# Patient Record
Sex: MALE | Race: WHITE | NOT HISPANIC OR LATINO | Employment: UNEMPLOYED | URBAN - METROPOLITAN AREA
[De-identification: names, ages, dates, MRNs, and addresses within clinical notes are randomized per-mention and may not be internally consistent; named-entity substitution may affect disease eponyms.]

---

## 2017-08-16 ENCOUNTER — APPOINTMENT (OUTPATIENT)
Dept: RADIOLOGY | Facility: MEDICAL CENTER | Age: 47
End: 2017-08-16
Attending: FAMILY MEDICINE
Payer: OTHER MISCELLANEOUS

## 2017-08-16 DIAGNOSIS — M25.561 RIGHT KNEE PAIN, UNSPECIFIED CHRONICITY: ICD-10-CM

## 2017-08-16 PROCEDURE — 73721 MRI JNT OF LWR EXTRE W/O DYE: CPT | Mod: RT

## 2019-06-12 ENCOUNTER — HOSPITAL ENCOUNTER (OUTPATIENT)
Dept: HOSPITAL 8 - RAD | Age: 49
Discharge: HOME | End: 2019-06-12
Attending: INTERNAL MEDICINE
Payer: COMMERCIAL

## 2019-06-12 DIAGNOSIS — I25.89: Primary | ICD-10-CM

## 2019-06-12 PROCEDURE — 78452 HT MUSCLE IMAGE SPECT MULT: CPT

## 2019-06-12 PROCEDURE — 93306 TTE W/DOPPLER COMPLETE: CPT

## 2019-06-12 PROCEDURE — A9502 TC99M TETROFOSMIN: HCPCS

## 2019-06-12 PROCEDURE — 93017 CV STRESS TEST TRACING ONLY: CPT

## 2019-07-10 ENCOUNTER — HOSPITAL ENCOUNTER (EMERGENCY)
Facility: MEDICAL CENTER | Age: 49
End: 2019-07-10
Attending: EMERGENCY MEDICINE
Payer: COMMERCIAL

## 2019-07-10 VITALS
HEIGHT: 66 IN | WEIGHT: 208.78 LBS | SYSTOLIC BLOOD PRESSURE: 126 MMHG | BODY MASS INDEX: 33.55 KG/M2 | DIASTOLIC BLOOD PRESSURE: 87 MMHG | RESPIRATION RATE: 16 BRPM | HEART RATE: 84 BPM | TEMPERATURE: 99.4 F | OXYGEN SATURATION: 96 %

## 2019-07-10 DIAGNOSIS — S09.91XA INJURY OF RIGHT EAR, INITIAL ENCOUNTER: ICD-10-CM

## 2019-07-10 PROCEDURE — 99284 EMERGENCY DEPT VISIT MOD MDM: CPT

## 2019-07-10 PROCEDURE — 700102 HCHG RX REV CODE 250 W/ 637 OVERRIDE(OP): Performed by: EMERGENCY MEDICINE

## 2019-07-10 PROCEDURE — A9270 NON-COVERED ITEM OR SERVICE: HCPCS | Performed by: EMERGENCY MEDICINE

## 2019-07-10 RX ORDER — NEOMYCIN SULFATE, POLYMYXIN B SULFATE AND HYDROCORTISONE 10; 3.5; 1 MG/ML; MG/ML; [USP'U]/ML
SUSPENSION/ DROPS AURICULAR (OTIC)
Status: COMPLETED
Start: 2019-07-10 | End: 2019-07-10

## 2019-07-10 RX ORDER — HYDROCODONE BITARTRATE AND ACETAMINOPHEN 5; 325 MG/1; MG/1
1-2 TABLET ORAL EVERY 6 HOURS PRN
Qty: 15 TAB | Refills: 0 | Status: SHIPPED | OUTPATIENT
Start: 2019-07-10 | End: 2019-07-14

## 2019-07-10 RX ADMIN — NEOMYCIN SULFATE, POLYMYXIN B SULFATE, HYDROCORTISONE 5 DROP: 3.5; 10000; 1 SOLUTION/ DROPS AURICULAR (OTIC) at 14:38

## 2019-07-10 NOTE — ED NOTES
Outpatient pharmacy called regarding Cortisporin ear drops. Clarified to be placed in right ear only.     Rosa Staples, IsabelD

## 2019-07-10 NOTE — LETTER
"  FORM C-4:  EMPLOYEE’S CLAIM FOR COMPENSATION/ REPORT OF INITIAL TREATMENT  EMPLOYEE’S CLAIM - PROVIDE ALL INFORMATION REQUESTED   First Name  Wil Last Name  Raciel Birthdate             Age  1970 49 y.o. Sex  male Claim Number   Home Employee Address  [address]                                     Zip  [address] Height  1.676 m (5' 6\") Weight  94.7 kg (208 lb 12.4 oz) [facility]     Mailing Employee Address                           [address]               Zip  [address] Telephone  [phone] (home)  Primary Language Spoken  ENGLISH   Insurer  [employer] Third [employer]   ALY CLAIMS [id number] Employee's Occupation (Job Title) When Injury or Occupational Disease Occurred     Employer's Name  [employer] Telephone  [phone]    Employer Address  [address]] Zip  [address]   Date of Injury  7/10/2019       Hour of Injury  1:10 PM Date Employer Notified  7/10/2019 Last Day of Work after Injury or Occupational Disease  7/10/2019 Supervisor to Whom Injury Reported  [place]   Address or Location of Accident (if applicable)  [[address] 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 Be specific and answer in detail. Use additional sheet if necessary)  Welding Slag fell in behind Helmet into ear   If you believe that you have an occupational disease, when did you first have knowledge of the disability and it relationship to your employment?  N/A Witnesses to the Accident  N/A     Nature of Injury or Occupational Disease  Foreign Body  Part(s) of Body Injured or Affected  Ear (R), N/A, N/A    I certify that the above is true and correct to the best of my knowledge and that I have provided this information in order to obtain the benefits of [place] " [place] or [place]).  I hereby authorize any physician, chiropractor, surgeon, practitioner, or other person, any hospital, including [facility] or [facility] hospital, any medical service organization, any insurance company, or other institution or organization to release to each other, any medical or other information, including benefits paid or payable, pertinent to this injury or disease, except information relative to diagnosis, treatment and/or counseling for AIDS, psychological conditions, alcohol or controlled substances, for which I must give specific authorization.  A Photostat of this authorization shall be as valid as the original.   Date  07/10/2019 Place:[facility]  Employee’s Signature   THIS REPORT MUST BE COMPLETED AND MAILED WITHIN 3 WORKING DAYS OF TREATMENT   Place  [facility], EMERGENCY DEPT  Name of Facility   [facility]   Date  7/10/2019 Diagnosis  (S09.91XA) Injury of right ear, initial encounter Is there evidence the injured employee was under the influence of alcohol and/or another controlled substance at the time of accident?   Hour  2:42 PM Description of Injury or Disease  Injury of right ear, initial encounter No   Treatment  Physician evaluation and treatment of thermal injury to the right external auditory canal and tympanic membrane  Have you advised the patient to remain off work five days or more?         No   X-Ray Findings  Negative   If Yes   From Date    To Date      From information given by the employee, together with medical evidence, can you directly connect this injury or occupational disease as job incurred?  Yes If No, is the employee capable of: Full Duty  Yes Modified Duty      Is additional medical care by a physician indicated?  Yes  Comments:Follow-up with Dr. Recio If Modified Duty, Specify any Limitations / Restrictions        Do you know  "of any previous injury or disease contributing to this condition or occupational disease?  No   Date  7/10/2019 Print Doctor’s Name  Leonardo Stephens I certify the employer’s copy of this form was mailed on:   Address  [address] [address] [phone] Insurer’s Use Only   [facility]  [address]    Provider’s Tax ID Number  [id number] Telephone  Dept: [phone]    Doctor’s Signature  e-LEONARDO Ro M.D. Degree       Original - TREATING PHYSICIAN OR CHIROPRACTOR   Pg 2-Insurer/TPA   Pg 3-Employer   Pg 4-Employee                                                                                                  Form C-4 (rev01/03)     BRIEF DESCRIPTION OF RIGHTS AND BENEFITS  (Pursuant to [place] 616C.050)    Notice of Injury or Occupational Disease (Incident Report Form C-1): If an injury or occupational disease (OD) arises out of and in the course of employment, you must provide written notice to your employer as soon as practicable, but no later than 7 days after the accident or OD. Your employer shall maintain a sufficient supply of the required forms.    Claim for Compensation (Form C-4): If medical treatment is sought, the form C-4 is available at the place of initial treatment. A completed \"Claim for Compensation\" (Form C-4) must be filed within 90 days after an accident or OD. The treating physician or chiropractor must, within 3 working days after treatment, complete and mail to the employer, the employer's insurer and third-party , the Claim for Compensation.    Medical Treatment: If you require medical treatment for your on-the-job injury or OD, you may be required to select a physician or chiropractor from a list provided by your workers’ compensation insurer, if it has contracted with an Organization for Managed Care (MCO) or Preferred Provider Organization (PPO) or providers of health care. If your employer has not entered into a contract with an MCO " or PPO, you may select a physician or chiropractor from the Panel of Physicians and Chiropractors. Any medical costs related to your industrial injury or OD will be paid by your insurer.    Temporary Total Disability (TTD): If your doctor has certified that you are unable to work for a period of at least 5 consecutive days, or 5 cumulative days in a 20-day period, or places restrictions on you that your employer does not accommodate, you may be entitled to TTD compensation.    Temporary Partial Disability (TPD): If the wage you receive upon reemployment is less than the compensation for TTD to which you are entitled, the insurer may be required to pay you TPD compensation to make up the difference. TPD can only be paid for a maximum of 24 months.    Permanent Partial Disability (PPD): When your medical condition is stable and there is an indication of a PPD as a result of your injury or OD, within 30 days, your insurer must arrange for an evaluation by a rating physician or chiropractor to determine the degree of your PPD. The amount of your PPD award depends on the date of injury, the results of the PPD evaluation and your age and wage.    Permanent Total Disability (PTD): If you are medically certified by a treating physician or chiropractor as permanently and totally disabled and have been granted a PTD status by your insurer, you are entitled to receive monthly benefits not to exceed 66 2/3% of your average monthly wage. The amount of your PTD payments is subject to reduction if you previously received a PPD award.    Vocational Rehabilitation Services: You may be eligible for vocational rehabilitation services if you are unable to return to the job due to a permanent physical impairment or permanent restrictions as a result of your injury or occupational disease.    Transportation and Per [place] Reimbursement: You may be eligible for travel expenses and per byron associated with medical treatment.  Reopening: You  may be able to reopen your claim if your condition worsens after claim closure.    Appeal Process: If you disagree with a written determination issued by the insurer or the insurer does not respond to your request, you may appeal to the Department of Administration, , by following the instructions contained in your determination letter. You must appeal the determination within 70 days from the date of the determination letter at [address], [address], [place], [place] [address], or [address], [place], [place] [address]. If you disagree with the  decision, you may appeal to the Department of Administration, . You must file your appeal within 30 days from the date of the  decision letter at [address], [address], [place], [place] [address], or [address], [place], [place] [address]. If you disagree with a decision of an , you may file a petition for judicial review with the District Court. You must do so within 30 days of the Appeal Officer’s decision. You may be represented by an  at your own expense or you may contact the [facility] for possible representation.    [place]  for Injured Workers (NAIW): If you disagree with a  decision, you may request that NAIW represent you without charge at an  Hearing. For information regarding denial of benefits, you may contact the [facility] at: [address], [address], [place] [address], [phone], or [address], [place] [address], [phone]    To File a Complaint with the Division: If you wish to file a complaint with the  of the Division of Industrial Relations (DIR), please contact the Workers’ Compensation Section, [address], [address], [place], [place] [address], telephone [phone], or [address], [address] [address] [facility] [address],  [place], [place] [address], telephone [phone].    For assistance with Workers’ Compensation Issues: you may contact the Office of the Governor Consumer Health Assistance, [address] [place] [address], [address], [place], [place] [address], Toll Free 1-820.298.7941, Web site: [email or web address]., E-mail [email or web address].                                                                                                                                                                               __________________________________________________________________                                    __07/10/2019_______________            Employee Name / Signature                                                                                                                            Date                                       D-2 (rev. 10/07)

## 2019-07-10 NOTE — ED PROVIDER NOTES
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 male who presents for evaluation of acute right ear injury.  The patient was working as a .  Apparently some molten metal got in his right external auditory canal.  This occurred around an hour ago.  He reports intense hearing loss no bleeding from the right ear.  Patient is up-to-date on his tetanus.  He is otherwise healthy no other injury to the face or eyes.    REVIEW OF SYSTEMS  See HPI for further details.  Positive for headache, right ear pain all other systems are negative.     PAST MEDICAL HISTORY  Past Medical History:   Diagnosis Date   • Right foot pain 7/2/2015   • BMI 36.0-36.9,adult 7/2/2015   • High cholesterol    • Hyperlipidemia        FAMILY HISTORY  Noncontributory    SOCIAL HISTORY  Social History     Social History   • Marital status: Single     Spouse name: N/A   • Number of children: N/A   • Years of education: N/A     Social History Main Topics   • Smoking status: Never Smoker   • Smokeless tobacco: Former User   • Alcohol use Yes      Comment: rare, twice/month   • Drug use: No   • Sexual activity: Yes     Partners: Female     Other Topics Concern   • Not on file     Social History Narrative   • No narrative on file     Denies IV drug  SURGICAL HISTORY  Past Surgical History:   Procedure Laterality Date   • CATARACT PHACO WITH IOL Left 1/12/2016    Procedure: CATARACT PHACO WITH IOL ANTERIOR CHAMBER;  Surgeon: Aureliano Epperson M.D.;  Location: SURGERY SURGICAL [facility];  Service:    • VITRECTOMY ANTERIOR Left 10/13/2015    Procedure: VITRECTOMY ANTERIOR - ANTERIOR SYNECHIALYSIS, IRIS REPAIR WITH MCCANNEL SUTURES;  Surgeon: Aureliano Epperson M.D.;  Location: SURGERY SURGICAL [facility];  Service:    • EYE SURGERY  10/3/08    Performed by SHANTEL GARAY at SURGERY SAME DAY [facility]  "ORS   • EYEBALL RUPTURE REPAIR  9/19/08    Performed by SHANTEL GARAY at SURGERY [facility] ORS   • OTHER ORTHOPEDIC SURGERY      bilateral rotator cuff repairs   • TONSILLECTOMY         CURRENT MEDICATIONS    Current Facility-Administered Medications:   •  neomycin sulf/polymyx B sulf/HC soln (CORTISPORIN HC SOL) 3.5-59652-2 otic solution 5 Drop, 5 Drop, Right Ear, Once, Leonardo Stephens M.D.    Current Outpatient Prescriptions:   •  atorvastatin (LIPITOR) 20 MG TABS, Take 1 Tab by mouth every day., Disp: 90 Tab, Rfl: 2  •  tramadol (ULTRAM) 50 MG TABS, Take  mg by mouth every four hours as needed., Disp: , Rfl:       ALLERGIES  No Known Allergies    PHYSICAL EXAM  VITAL SIGNS: /90   Pulse 79   Temp 37.3 °C (99.1 °F) (Temporal)   Resp 20   Ht 1.676 m (5' 6\")   Wt 94.7 kg (208 lb 12.4 oz)   BMI 33.70 kg/m²  Room air O2: 96    Constitutional: Well developed, Well nourished, No acute distress, Non-toxic appearance.   HENT: Normocephalic, Atraumatic, Bilateral external ears normal, Oropharynx moist, No oral exudates, Nose normal.  The right external auditory canal is erythematous the tympanic membrane appears to be intact but is red there is no clear or obvious perforation or foreign body  Eyes: PERRLA, EOMI, Conjunctiva normal, No discharge.   Neck: Normal range of motion, No tenderness, Supple, No stridor.   Cardiovascular: Normal heart rate, Normal rhythm, No murmurs, No rubs, No gallops.   Thorax & Lungs: Normal breath sounds, No respiratory distress, No wheezing, No chest tenderness.   Abdomen: Bowel sounds normal, Soft, No tenderness, No masses, No pulsatile masses.   Skin: Warm, Dry, No erythema, No rash.   Extremities: Intact distal pulses, No edema, No tenderness, No cyanosis, No clubbing.   Musculoskeletal: Good range of motion in all major joints. No tenderness to palpation or major deformities noted.   Neurologic: Alert & oriented x 3, Normal motor function, Normal sensory function, No " focal deficits noted.   Psychiatric: Anxious      COURSE & MEDICAL DECISION MAKING  Pertinent Labs & Imaging studies reviewed. (See chart for details)  Patient has femoral injury to the right external auditory canal and tympanic membrane.  I would expect his hearing should ultimately come back.  He will be referred to ENT for follow-up.  I will start him on a round again on Cortisporin otic    FINAL IMPRESSION  1.  Thermal injury to right tympanic membrane and external auditory canal         Electronically signed by: Leonardo Stephens, 7/10/2019 2:00 PM

## 2019-07-10 NOTE — ED TRIAGE NOTES
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

## 2020-08-07 ENCOUNTER — HOSPITAL ENCOUNTER (OUTPATIENT)
Dept: RADIOLOGY | Facility: MEDICAL CENTER | Age: 50
End: 2020-08-07
Attending: ORTHOPAEDIC SURGERY
Payer: COMMERCIAL

## 2020-08-07 DIAGNOSIS — M75.111 INCOMPLETE ROTATOR CUFF TEAR OR RUPTURE OF RIGHT SHOULDER, NOT SPECIFIED AS TRAUMATIC: ICD-10-CM

## 2020-08-07 PROCEDURE — 73221 MRI JOINT UPR EXTREM W/O DYE: CPT | Mod: RT

## 2020-11-19 ENCOUNTER — PRE-ADMISSION TESTING (OUTPATIENT)
Dept: ADMISSIONS | Facility: MEDICAL CENTER | Age: 50
End: 2020-11-19
Attending: ORTHOPAEDIC SURGERY
Payer: COMMERCIAL

## 2020-11-19 DIAGNOSIS — Z01.812 PRE-OPERATIVE LABORATORY EXAMINATION: ICD-10-CM

## 2020-11-19 LAB — COVID ORDER STATUS COVID19: NORMAL

## 2020-11-19 PROCEDURE — C9803 HOPD COVID-19 SPEC COLLECT: HCPCS

## 2020-11-19 PROCEDURE — U0003 INFECTIOUS AGENT DETECTION BY NUCLEIC ACID (DNA OR RNA); SEVERE ACUTE RESPIRATORY SYNDROME CORONAVIRUS 2 (SARS-COV-2) (CORONAVIRUS DISEASE [COVID-19]), AMPLIFIED PROBE TECHNIQUE, MAKING USE OF HIGH THROUGHPUT TECHNOLOGIES AS DESCRIBED BY CMS-2020-01-R: HCPCS

## 2020-11-19 RX ORDER — TESTOSTERONE CYPIONATE 200 MG/ML
INJECTION, SOLUTION INTRAMUSCULAR
COMMUNITY
Start: 2020-09-02 | End: 2024-03-07

## 2020-11-19 RX ORDER — HYDROXYZINE 50 MG/1
50 TABLET, FILM COATED ORAL PRN
COMMUNITY
Start: 2020-11-06 | End: 2022-09-06

## 2020-11-19 RX ORDER — SERTRALINE HYDROCHLORIDE 100 MG/1
TABLET, FILM COATED ORAL DAILY
COMMUNITY
End: 2022-09-06

## 2020-11-19 RX ORDER — ALPRAZOLAM 0.5 MG/1
TABLET ORAL PRN
COMMUNITY
Start: 2020-11-17 | End: 2022-09-06

## 2020-11-19 RX ORDER — ASPIRIN 81 MG/1
TABLET ORAL DAILY
COMMUNITY
End: 2022-09-06

## 2020-11-19 RX ORDER — CLOTRIMAZOLE 1 %
CREAM (GRAM) TOPICAL PRN
COMMUNITY
Start: 2020-08-25 | End: 2024-03-07

## 2020-11-19 RX ORDER — LEVOTHYROXINE SODIUM 88 UG/1
88 TABLET ORAL
COMMUNITY
Start: 2020-10-03 | End: 2022-09-06

## 2020-11-19 NOTE — OR NURSING
"Preadmit appointment: \" Preparing for your Procedure information\" sheet given to patient with verbal and written instructions. Patient instructed to continue prescribed medications through the day before surgery, instructed to take the following medications the day of surgery per anesthesia protocol: Euthrox, Sertraline, Alprazolam if needed  Verbal and written instructions on self isolation until surgery and covid symptoms to watch for given to patient and patient instructed to call MD if any symptoms develop prior to surgery/procedure. Pt states he will self isolate until surgery after covid testing done today. Pt denies issues with anesthesia  "

## 2020-11-20 LAB
SARS-COV-2 RNA RESP QL NAA+PROBE: NOTDETECTED
SPECIMEN SOURCE: NORMAL

## 2020-11-24 ENCOUNTER — ANESTHESIA EVENT (OUTPATIENT)
Dept: SURGERY | Facility: MEDICAL CENTER | Age: 50
End: 2020-11-24
Payer: COMMERCIAL

## 2020-11-24 ENCOUNTER — HOSPITAL ENCOUNTER (OUTPATIENT)
Facility: MEDICAL CENTER | Age: 50
End: 2020-11-24
Attending: ORTHOPAEDIC SURGERY | Admitting: ORTHOPAEDIC SURGERY
Payer: COMMERCIAL

## 2020-11-24 ENCOUNTER — ANESTHESIA (OUTPATIENT)
Dept: SURGERY | Facility: MEDICAL CENTER | Age: 50
End: 2020-11-24
Payer: COMMERCIAL

## 2020-11-24 VITALS
HEIGHT: 66 IN | DIASTOLIC BLOOD PRESSURE: 54 MMHG | SYSTOLIC BLOOD PRESSURE: 98 MMHG | OXYGEN SATURATION: 95 % | WEIGHT: 224.87 LBS | TEMPERATURE: 96.8 F | BODY MASS INDEX: 36.14 KG/M2 | RESPIRATION RATE: 16 BRPM | HEART RATE: 80 BPM

## 2020-11-24 DIAGNOSIS — Z01.812 PRE-OPERATIVE LABORATORY EXAMINATION: ICD-10-CM

## 2020-11-24 PROCEDURE — 700111 HCHG RX REV CODE 636 W/ 250 OVERRIDE (IP): Performed by: ANESTHESIOLOGY

## 2020-11-24 PROCEDURE — 160048 HCHG OR STATISTICAL LEVEL 1-5: Performed by: ORTHOPAEDIC SURGERY

## 2020-11-24 PROCEDURE — 160036 HCHG PACU - EA ADDL 30 MINS PHASE I: Performed by: ORTHOPAEDIC SURGERY

## 2020-11-24 PROCEDURE — 160029 HCHG SURGERY MINUTES - 1ST 30 MINS LEVEL 4: Performed by: ORTHOPAEDIC SURGERY

## 2020-11-24 PROCEDURE — 700101 HCHG RX REV CODE 250: Performed by: ANESTHESIOLOGY

## 2020-11-24 PROCEDURE — 501838 HCHG SUTURE GENERAL: Performed by: ORTHOPAEDIC SURGERY

## 2020-11-24 PROCEDURE — A9270 NON-COVERED ITEM OR SERVICE: HCPCS | Performed by: ANESTHESIOLOGY

## 2020-11-24 PROCEDURE — 700102 HCHG RX REV CODE 250 W/ 637 OVERRIDE(OP): Performed by: ANESTHESIOLOGY

## 2020-11-24 PROCEDURE — 160009 HCHG ANES TIME/MIN: Performed by: ORTHOPAEDIC SURGERY

## 2020-11-24 PROCEDURE — 64415 NJX AA&/STRD BRCH PLXS IMG: CPT | Performed by: ORTHOPAEDIC SURGERY

## 2020-11-24 PROCEDURE — 160041 HCHG SURGERY MINUTES - EA ADDL 1 MIN LEVEL 4: Performed by: ORTHOPAEDIC SURGERY

## 2020-11-24 PROCEDURE — C1713 ANCHOR/SCREW BN/BN,TIS/BN: HCPCS | Performed by: ORTHOPAEDIC SURGERY

## 2020-11-24 PROCEDURE — 700105 HCHG RX REV CODE 258: Performed by: ORTHOPAEDIC SURGERY

## 2020-11-24 PROCEDURE — 160025 RECOVERY II MINUTES (STATS): Performed by: ORTHOPAEDIC SURGERY

## 2020-11-24 PROCEDURE — 700111 HCHG RX REV CODE 636 W/ 250 OVERRIDE (IP): Performed by: ORTHOPAEDIC SURGERY

## 2020-11-24 PROCEDURE — 502581 HCHG PACK, SHOULDER ARTHROSCOPY: Performed by: ORTHOPAEDIC SURGERY

## 2020-11-24 PROCEDURE — 160002 HCHG RECOVERY MINUTES (STAT): Performed by: ORTHOPAEDIC SURGERY

## 2020-11-24 PROCEDURE — 160046 HCHG PACU - 1ST 60 MINS PHASE II: Performed by: ORTHOPAEDIC SURGERY

## 2020-11-24 PROCEDURE — 160035 HCHG PACU - 1ST 60 MINS PHASE I: Performed by: ORTHOPAEDIC SURGERY

## 2020-11-24 PROCEDURE — A6223 GAUZE >16<=48 NO W/SAL W/O B: HCPCS | Performed by: ORTHOPAEDIC SURGERY

## 2020-11-24 PROCEDURE — 700101 HCHG RX REV CODE 250: Performed by: ORTHOPAEDIC SURGERY

## 2020-11-24 DEVICE — ANCHOR KNOTLESS REELX STT 4.5MM (5EA/BX): Type: IMPLANTABLE DEVICE | Site: SHOULDER | Status: FUNCTIONAL

## 2020-11-24 RX ORDER — MIDAZOLAM HYDROCHLORIDE 1 MG/ML
1 INJECTION INTRAMUSCULAR; INTRAVENOUS
Status: DISCONTINUED | OUTPATIENT
Start: 2020-11-24 | End: 2020-11-24 | Stop reason: HOSPADM

## 2020-11-24 RX ORDER — ROCURONIUM BROMIDE 10 MG/ML
INJECTION, SOLUTION INTRAVENOUS PRN
Status: DISCONTINUED | OUTPATIENT
Start: 2020-11-24 | End: 2020-11-24 | Stop reason: SURG

## 2020-11-24 RX ORDER — OXYCODONE HCL 10 MG/1
10 TABLET, FILM COATED, EXTENDED RELEASE ORAL ONCE
Status: COMPLETED | OUTPATIENT
Start: 2020-11-24 | End: 2020-11-24

## 2020-11-24 RX ORDER — PHENYLEPHRINE HCL IN 0.9% NACL 0.5 MG/5ML
SYRINGE (ML) INTRAVENOUS PRN
Status: DISCONTINUED | OUTPATIENT
Start: 2020-11-24 | End: 2020-11-24 | Stop reason: SURG

## 2020-11-24 RX ORDER — MIDAZOLAM HYDROCHLORIDE 1 MG/ML
INJECTION INTRAMUSCULAR; INTRAVENOUS PRN
Status: DISCONTINUED | OUTPATIENT
Start: 2020-11-24 | End: 2020-11-24 | Stop reason: SURG

## 2020-11-24 RX ORDER — ONDANSETRON 2 MG/ML
INJECTION INTRAMUSCULAR; INTRAVENOUS PRN
Status: DISCONTINUED | OUTPATIENT
Start: 2020-11-24 | End: 2020-11-24 | Stop reason: SURG

## 2020-11-24 RX ORDER — OXYCODONE HCL 5 MG/5 ML
10 SOLUTION, ORAL ORAL
Status: DISCONTINUED | OUTPATIENT
Start: 2020-11-24 | End: 2020-11-24 | Stop reason: HOSPADM

## 2020-11-24 RX ORDER — LIDOCAINE HYDROCHLORIDE 20 MG/ML
INJECTION, SOLUTION EPIDURAL; INFILTRATION; INTRACAUDAL; PERINEURAL PRN
Status: DISCONTINUED | OUTPATIENT
Start: 2020-11-24 | End: 2020-11-24 | Stop reason: SURG

## 2020-11-24 RX ORDER — SODIUM CHLORIDE, SODIUM LACTATE, POTASSIUM CHLORIDE, CALCIUM CHLORIDE 600; 310; 30; 20 MG/100ML; MG/100ML; MG/100ML; MG/100ML
INJECTION, SOLUTION INTRAVENOUS CONTINUOUS
Status: DISCONTINUED | OUTPATIENT
Start: 2020-11-24 | End: 2020-11-24 | Stop reason: HOSPADM

## 2020-11-24 RX ORDER — EPINEPHRINE 1 MG/ML(1)
AMPUL (ML) INJECTION
Status: DISCONTINUED | OUTPATIENT
Start: 2020-11-24 | End: 2020-11-24 | Stop reason: HOSPADM

## 2020-11-24 RX ORDER — ROPIVACAINE HYDROCHLORIDE 5 MG/ML
INJECTION, SOLUTION EPIDURAL; INFILTRATION; PERINEURAL PRN
Status: DISCONTINUED | OUTPATIENT
Start: 2020-11-24 | End: 2020-11-24 | Stop reason: SURG

## 2020-11-24 RX ORDER — ACETAMINOPHEN 500 MG
1000 TABLET ORAL ONCE
Status: COMPLETED | OUTPATIENT
Start: 2020-11-24 | End: 2020-11-24

## 2020-11-24 RX ORDER — DEXAMETHASONE SODIUM PHOSPHATE 4 MG/ML
INJECTION, SOLUTION INTRA-ARTICULAR; INTRALESIONAL; INTRAMUSCULAR; INTRAVENOUS; SOFT TISSUE PRN
Status: DISCONTINUED | OUTPATIENT
Start: 2020-11-24 | End: 2020-11-24 | Stop reason: SURG

## 2020-11-24 RX ORDER — GABAPENTIN 300 MG/1
300 CAPSULE ORAL ONCE
Status: COMPLETED | OUTPATIENT
Start: 2020-11-24 | End: 2020-11-24

## 2020-11-24 RX ORDER — CEFAZOLIN SODIUM 1 G/3ML
INJECTION, POWDER, FOR SOLUTION INTRAMUSCULAR; INTRAVENOUS PRN
Status: DISCONTINUED | OUTPATIENT
Start: 2020-11-24 | End: 2020-11-24 | Stop reason: SURG

## 2020-11-24 RX ORDER — OXYCODONE HCL 5 MG/5 ML
5 SOLUTION, ORAL ORAL
Status: DISCONTINUED | OUTPATIENT
Start: 2020-11-24 | End: 2020-11-24 | Stop reason: HOSPADM

## 2020-11-24 RX ORDER — CELECOXIB 200 MG/1
400 CAPSULE ORAL ONCE
Status: COMPLETED | OUTPATIENT
Start: 2020-11-24 | End: 2020-11-24

## 2020-11-24 RX ORDER — KETOROLAC TROMETHAMINE 30 MG/ML
INJECTION, SOLUTION INTRAMUSCULAR; INTRAVENOUS PRN
Status: DISCONTINUED | OUTPATIENT
Start: 2020-11-24 | End: 2020-11-24 | Stop reason: SURG

## 2020-11-24 RX ORDER — HALOPERIDOL 5 MG/ML
1 INJECTION INTRAMUSCULAR
Status: DISCONTINUED | OUTPATIENT
Start: 2020-11-24 | End: 2020-11-24 | Stop reason: HOSPADM

## 2020-11-24 RX ADMIN — ROPIVACAINE HYDROCHLORIDE 15 ML: 5 INJECTION, SOLUTION EPIDURAL; INFILTRATION; PERINEURAL at 08:48

## 2020-11-24 RX ADMIN — CELECOXIB 400 MG: 200 CAPSULE ORAL at 08:20

## 2020-11-24 RX ADMIN — SUGAMMADEX 200 MG: 100 INJECTION, SOLUTION INTRAVENOUS at 10:09

## 2020-11-24 RX ADMIN — OXYCODONE HYDROCHLORIDE 10 MG: 10 TABLET, FILM COATED, EXTENDED RELEASE ORAL at 08:21

## 2020-11-24 RX ADMIN — Medication 100 MCG: at 09:21

## 2020-11-24 RX ADMIN — KETOROLAC TROMETHAMINE 30 MG: 30 INJECTION, SOLUTION INTRAMUSCULAR at 10:03

## 2020-11-24 RX ADMIN — Medication 100 MCG: at 09:23

## 2020-11-24 RX ADMIN — PROPOFOL 200 MG: 10 INJECTION, EMULSION INTRAVENOUS at 09:06

## 2020-11-24 RX ADMIN — CEFAZOLIN 2 G: 1 INJECTION, POWDER, FOR SOLUTION INTRAVENOUS at 09:08

## 2020-11-24 RX ADMIN — DEXAMETHASONE SODIUM PHOSPHATE 10 MG: 4 INJECTION, SOLUTION INTRAMUSCULAR; INTRAVENOUS at 09:09

## 2020-11-24 RX ADMIN — LIDOCAINE HYDROCHLORIDE 4 ML: 20 INJECTION, SOLUTION EPIDURAL; INFILTRATION; INTRACAUDAL; PERINEURAL at 08:48

## 2020-11-24 RX ADMIN — Medication 200 MCG: at 09:55

## 2020-11-24 RX ADMIN — Medication 200 MCG: at 09:35

## 2020-11-24 RX ADMIN — MIDAZOLAM HYDROCHLORIDE 2 MG: 1 INJECTION, SOLUTION INTRAMUSCULAR; INTRAVENOUS at 08:48

## 2020-11-24 RX ADMIN — ACETAMINOPHEN 1000 MG: 500 TABLET, FILM COATED ORAL at 08:20

## 2020-11-24 RX ADMIN — SODIUM CHLORIDE, POTASSIUM CHLORIDE, SODIUM LACTATE AND CALCIUM CHLORIDE: 600; 310; 30; 20 INJECTION, SOLUTION INTRAVENOUS at 07:12

## 2020-11-24 RX ADMIN — FENTANYL CITRATE 100 MCG: 50 INJECTION, SOLUTION INTRAMUSCULAR; INTRAVENOUS at 09:06

## 2020-11-24 RX ADMIN — GABAPENTIN 300 MG: 300 CAPSULE ORAL at 08:20

## 2020-11-24 RX ADMIN — ONDANSETRON 4 MG: 2 INJECTION INTRAMUSCULAR; INTRAVENOUS at 10:03

## 2020-11-24 RX ADMIN — ROCURONIUM BROMIDE 50 MG: 10 INJECTION, SOLUTION INTRAVENOUS at 09:06

## 2020-11-24 RX ADMIN — Medication 100 MCG: at 09:22

## 2020-11-24 RX ADMIN — WATER 15 ML: 100 IRRIGANT IRRIGATION at 07:12

## 2020-11-24 ASSESSMENT — PAIN SCALES - GENERAL: PAIN_LEVEL: 0

## 2020-11-24 ASSESSMENT — PAIN DESCRIPTION - PAIN TYPE: TYPE: SURGICAL PAIN

## 2020-11-24 NOTE — PROGRESS NOTES
Patient to pre-op. NPO status and allergies confirmed. IV access established. Consents signed and in chart. Patient demonstrates understanding of pain scale.Surgical site verified. Triple AIM completed. SCD's in place as appropriate. VSS. Patient resting comfortably.

## 2020-11-24 NOTE — ANESTHESIA PROCEDURE NOTES
Peripheral Block    Date/Time: 11/24/2020 8:48 AM  Performed by: Stefani Hernandez M.D.  Authorized by: Stefani Hernandez M.D.     Patient Location:  Pre-op  Start Time:  11/24/2020 8:48 AM  Reason for Block: at surgeon's request and post-op pain management    patient identified, IV checked, site marked, risks and benefits discussed, surgical consent, monitors and equipment checked, pre-op evaluation and timeout performed    Patient Position:  Sitting  Prep: ChloraPrep    Monitoring:  Heart rate, continuous pulse ox and cardiac monitor  Block Region:  Upper Extremity  Upper Extremity - Block Type:  BRACHIAL PLEXUS block, Interscalene approach    Laterality:  Right  Procedures: ultrasound guided  Image captured, interpreted and electronically stored.  Local Infiltration:  Lidocaine  Strength:  1 %  Dose:  3 ml  Block Type:  Single-shot  Needle Length:  50mm  Needle Gauge:  22 G  Needle Localization:  Ultrasound guidance  Injection Assessment:  Negative aspiration for heme, no paresthesia on injection, incremental injection and local visualized surrounding nerve on ultrasound  Evidence of intravascular injection: No

## 2020-11-24 NOTE — OR SURGEON
Immediate Post OP Note    PreOp Diagnosis: internal derangment rt shoulder    PostOp Diagnosis: rt shoulder RC tear, biceps tendonitis    Procedure(s):  ARTHROSCOPY, SHOULDER, WITH ROTATOR CUFF REPAIR WITH DEBRIDEMENT OF GLENOHUMERAL JOINT - Wound Class: Clean  ARTHROSCOPY, SHOULDER, WITH BICEPS  TENOTOMY - Wound Class: Clean  DECOMPRESSION, SUBACROMIAL SPACE - Wound Class: Clean    Surgeon(s):  Augusto Wilson M.D.    Anesthesiologist/Type of Anesthesia:  Anesthesiologist: Stefani Hernandez M.D./General    Surgical Staff:  Circulator: Anne Garcia R.N.; Kenan Jara R.N.  Scrub Person: Bernardino Hess    Specimens removed if any:  * No specimens in log *    Estimated Blood Loss: minimal    Findings: large rc tear    Complications: none        11/24/2020 10:13 AM Augusto Wilson M.D.

## 2020-11-24 NOTE — ANESTHESIA POSTPROCEDURE EVALUATION
Patient: Wil Schrader    Procedure Summary     Date: 11/24/20 Room / Location:  OR  / SURGERY [facility]    Anesthesia Start: 0902 Anesthesia Stop: 1016    Procedures:       ARTHROSCOPY, SHOULDER, WITH ROTATOR CUFF REPAIR WITH DEBRIDEMENT OF GLENOHUMERAL JOINT (Right Shoulder)      ARTHROSCOPY, SHOULDER, WITH BICEPS  TENOTOMY (Right Shoulder)      DECOMPRESSION, SUBACROMIAL SPACE (Right Shoulder) Diagnosis: (PARTIAL THICKNESS ROTATOR CUFF TEAR RIGHT)    Surgeons: Augusto Wilson M.D. Responsible Provider: Stefani Hernandez M.D.    Anesthesia Type: general, peripheral nerve block ASA Status: 2          Final Anesthesia Type: general, peripheral nerve block  Last vitals  BP   Blood Pressure: (!) 98/54, NIBP: 104/66    Temp   36.6 °C (97.9 °F)    Pulse   Pulse: 66   Resp   16    SpO2   94 %      Anesthesia Post Evaluation    Patient location during evaluation: PACU  Patient participation: complete - patient participated  Level of consciousness: awake and alert  Pain score: 0    Airway patency: patent  Anesthetic complications: no  Cardiovascular status: adequate  Respiratory status: acceptable  Hydration status: acceptable    PONV: none           Nurse Pain Score: 0 (NPRS)

## 2020-11-24 NOTE — ANESTHESIA PROCEDURE NOTES
Airway    Date/Time: 11/24/2020 9:07 AM  Performed by: Stefani Hernandez M.D.  Authorized by: Stefani Hernandez M.D.     Location:  OR  Urgency:  Elective  Indications for Airway Management:  Anesthesia      Spontaneous Ventilation: absent    Sedation Level:  Deep  Preoxygenated: Yes    Patient Position:  Sniffing  Mask Difficulty Assessment:  1 - vent by mask  Final Airway Type:  Endotracheal airway  Final Endotracheal Airway:  ETT  Cuffed: Yes    Technique Used for Successful ETT Placement:  Direct laryngoscopy  Devices/Methods Used in Placement:  Intubating stylet    Insertion Site:  Oral  Blade Type:  Nunu  Laryngoscope Blade/Videolaryngoscope Blade Size:  4  ETT Size (mm):  7.0  Measured from:  Teeth  ETT to Teeth (cm):  23  Placement Verified by: auscultation and capnometry    Cormack-Lehane Classification:  Grade IIb - view of arytenoids or posterior of glottis only  Number of Attempts at Approach:  1

## 2020-11-24 NOTE — OR NURSING
1023: Care assumed of pt who rouses spontaneously and denies pain/nausea. O2 weaning commenced, respirations spontaneous and non-labored. Icepack applied over c/d/i R shoulder surgical dressings. Plan to keep pt in PACU for full hour per STOPBANG protocol.  1030: sleeping, not roused  1040: rouses spontaneously. DB&C, O2 d/mark, commenced po water.  1045: No change in surgical site assessment.  1100: Verbalizes comfort. Pupils noted to be unequal - pt states he has has multiple eye surgeries and this is his baseline.  1115: No change in surgical site assessment.Meets criteria to transfer to Stage 2

## 2020-11-24 NOTE — OR NURSING
1126: To stage ll bathroom, then to chair w/ standby assist. No pain or nausea.  1149: Home care instructions reviewed w/ pt and GF. No questions, meets criteria for discharge.

## 2020-11-24 NOTE — ANESTHESIA PREPROCEDURE EVALUATION
Torn rotator cuff right  Obesity  Elevated lipids    Relevant Problems   No relevant active problems       Physical Exam    Airway   Mallampati: II  TM distance: >3 FB  Neck ROM: full       Cardiovascular - normal exam  Rhythm: regular  Rate: normal     Dental - normal exam           Pulmonary - normal exam  Breath sounds clear to auscultation     Abdominal    Neurological - normal exam               Anesthesia Plan    ASA 2       Plan - general and peripheral nerve block     Peripheral nerve block will be post-op pain control  Airway plan will be ETT        Induction: intravenous      Pertinent diagnostic labs and testing reviewed    Informed Consent:    Anesthetic plan and risks discussed with patient.

## 2020-11-24 NOTE — ANESTHESIA TIME REPORT
Anesthesia Start and Stop Event Times     Date Time Event    11/24/2020 0858 Ready for Procedure     0902 Anesthesia Start     1016 Anesthesia Stop        Responsible Staff  11/24/20    Name Role Begin End    Stefani Hernandez M.D. Anesth 0902 1016        Preop Diagnosis (Free Text):  Pre-op Diagnosis     PARTIAL THICKNESS ROTATOR CUFF TEAR RIGHT        Preop Diagnosis (Codes):    Post op Diagnosis  Right rotator cuff tear      Premium Reason  Non-Premium    Comments:

## 2020-11-24 NOTE — PROGRESS NOTES
1015 Patient to PACU from OR on hospital bed with side rails up for safety, accompanied by RN and anesthesia. Identity and allergies verified. Bed locked and in low position. OPA in place 6L 02 mask on, respirations spontaneous and unlabored. VSS. SCD's on and functioning appropriately. 2+ pulses BLE. Surgical dressing to right upper extremity clean dry and intact.

## 2020-11-24 NOTE — OP REPORT
DATE OF SERVICE:  11/24/2020    PREOPERATIVE DIAGNOSIS:  Internal derangement, right shoulder.    POSTOPERATIVE DIAGNOSES:  1.  Right shoulder rotator cuff tear.  2.  Right shoulder subacromial impingement.  3.  Right shoulder biceps tendonitis.    PROCEDURES PERFORMED:  1.  Arthroscopy, shoulder, surgical; with rotator cuff repair.  2.  Arthroscopy, shoulder, surgical; with subacromial decompression.  3.  Arthroscopy, shoulder, surgical; with debridement of glenohumeral joint   including biceps tenotomy.    SURGEON:  Augusto Wilson MD    ASSISTANT:  SARA Barone    ANESTHESIA:  General endotracheal anesthesia with interscalene block.    ANESTHESIOLOGIST:  Stefani Hernandez MD    ESTIMATED BLOOD LOSS:  Minimal.    COMPLICATIONS:  None.    INDICATIONS FOR PROCEDURE:  This 50-year-old male with significant weakness   and pain in his right shoulder.  MRI demonstrates a large rotator cuff tear.    He presents for operative treatment.  For further details of H and P, please   see chart.    Risks, benefits, and alternatives of the procedure were discussed with the   patient to include but not limited to bleeding, infection, neurovascular   injury, need for further surgery, PE, DVT, blood transfusion with its   associated risks, anesthesia with its associated risks, and death.  All   questions were answered.  No warranties or guarantees were given or implied.    Consent is signed and on chart.    DESCRIPTION OF PROCEDURE:  Patient was taken to the operating room, placed   supine on the operating table.  Prior to this, an interscalene block was   placed in preoperative holding for postoperative pain control.  The patient   was placed in beach chair position.  All bony prominences were well padded.    The right shoulder and upper extremity were prepped and draped in normal   sterile fashion.  Posterior portal was identified and spinal needle was placed   in the glenohumeral joint.  This was insufflated with 60  mL of normal saline.    Portal was incised and arthroscope placed in the shoulder.  Anterior portal   was created using outside-in technique.  Examination shows severe fraying of   the biceps tendon with some scarring of the biceps tendon and the anterior   capsule.  A shaver was placed and biceps tenotomy was performed.  The biceps   stump was debrided back.  Examination of subscapular showed some mild   splitting of the fibers, but no tearing off of its insertion.  There was mild   chondromalacia of the glenoid.  No chondromalacia of the humerus.  Rotator   cuff showed a large retracted tear.  Shaver was placed and the rotator cuff   was debrided back.  Labrum and the remaining biceps had previously been   debrided.  At this point, the arthroscope was removed from the glenohumeral   joint and placed in subacromial space.  A lateral portal was created.    Bursectomy was performed.  Soft tissue was removed off the underside of the   acromion.  There was a small subacromial spur.  This was debrided back using a   bone resector.  Footprint for the rotator cuff was then cleaned up to a good   bleeding tissue using a bone resector.  Examination of the cuff showed to be   freely mobile.  There was some significant thickening and fraying.  The edge   was shaved back using a shaver.  Three mattress type stitches were then placed   using a Cobra suture passer.  Three [place] ReelX anchors were used to   reapproximate the cuff.  This reapproximated the cuff to the footprint quite   nicely.  There was no liftoff or other abnormalities noted.  At this point,   the arthroscope was removed from the shoulder.  As much fluid was expressed as   possible.  The portals were closed using 4-0 nylon.  The patient was placed   in a soft sterile dressing and abduction sling.  He was awakened from   anesthesia and returned to recovery cart in the recovery room in stable   condition.  There were no omari or intraoperative complications  noted.       ____________________________________     MD ROSIBEL Oliveira / [place]    DD:  11/24/2020 10:17:39  DT:  11/24/2020 10:36:58    D#:  [id number]  Job#:  [id number]

## 2020-11-24 NOTE — DISCHARGE INSTRUCTIONS
ACTIVITY: Rest and take it easy for the first 24 hours.  A responsible adult is recommended to remain with you during that time.  It is normal to feel sleepy.  We encourage you to not do anything that requires balance, judgment or coordination.    MILD FLU-LIKE SYMPTOMS ARE NORMAL. YOU MAY EXPERIENCE GENERALIZED MUSCLE ACHES, THROAT IRRITATION, HEADACHE AND/OR SOME NAUSEA.    FOR 24 HOURS DO NOT:  Drive, operate machinery or run household appliances.  Drink beer or alcoholic beverages.   Make important decisions or sign legal documents.    SPECIAL INSTRUCTIONS: Wear immobilizer at all times until instructed otherwise by your surgeon. DO NOT bear any weight with operative hand/arm.    DIET: To avoid nausea, slowly advance diet as tolerated, avoiding spicy or greasy foods for the first day.  Add more substantial food to your diet according to your physician's instructions.  Babies can be fed formula or breast milk as soon as they are hungry.  INCREASE FLUIDS AND FIBER TO AVOID CONSTIPATION.    SURGICAL DRESSING/BATHING: Keep dressing clean, dry and intact until instructed otherwise by surgeon    FOLLOW-UP APPOINTMENT:  A follow-up appointment should be arranged with your doctor; call to schedule.    You should CALL YOUR PHYSICIAN if you develop:  Fever greater than 101 degrees F.  Pain not relieved by medication, or persistent nausea or vomiting.  Excessive bleeding (blood soaking through dressing) or unexpected drainage from the wound.  Extreme redness or swelling around the incision site, drainage of pus or foul smelling drainage.  Inability to urinate or empty your bladder within 8 hours.  Problems with breathing or chest pain.    You should call 911 if you develop problems with breathing or chest pain.  If you are unable to contact your doctor or surgical center, you should go to the nearest emergency room or urgent care center.  Physician's telephone #: [phone]    If any questions arise, call your doctor.  If  your doctor is not available, please feel free to call the Surgical Center at [phone]. The Contact Center is open Monday through Friday 7AM to 5PM and may speak to a nurse at [phone], or toll free at [phone].     A registered nurse may call you a few days after your surgery to see how you are doing after your procedure.    MEDICATIONS: Resume taking daily medication.  Take prescribed pain medication with food.  If no medication is prescribed, you may take non-aspirin pain medication if needed.  PAIN MEDICATION CAN BE VERY CONSTIPATING.  Take a stool softener or laxative such as senokot, pericolace, or milk of magnesia if needed.    Prescription given for pre-op.  Last pain medication given at _________________.    If your physician has prescribed pain medication that includes Acetaminophen (Tylenol), do not take additional Acetaminophen (Tylenol) while taking the prescribed medication.    Depression / Suicide Risk    As you are discharged from this [facility] facility, it is important to learn how to keep safe from harming yourself.    Recognize the warning signs:  · Abrupt changes in personality, positive or negative- including increase in energy   · Giving away possessions  · Change in eating patterns- significant weight changes-  positive or negative  · Change in sleeping patterns- unable to sleep or sleeping all the time   · Unwillingness or inability to communicate  · Depression  · Unusual sadness, discouragement and loneliness  · Talk of wanting to die  · Neglect of personal appearance   · Rebelliousness- reckless behavior  · Withdrawal from people/activities they love  · Confusion- inability to concentrate     If you or a loved one observes any of these behaviors or has concerns about self-harm, here's what you can do:  · Talk about it- your feelings and reasons for harming yourself  · Remove any means that you might use to hurt yourself (examples: pills, rope, extension cords,  firearm)  · Get professional help from the community (Mental Health, Substance Abuse, psychological counseling)  · Do not be alone:Call your Safe Contact- someone whom you trust who will be there for you.  · Call your local CRISIS HOTLINE 533-8377 or 753-235-0680  · Call your local Children's Mobile Crisis Response Team [place] (552) 104-3996 or www.[employer]  · Call the toll free National Suicide Prevention Hotlines   · National Suicide Prevention Lifeline 834-392-HNOA (5442)  [facility] 800-SUICIDE (583-0891)    Discharge Education for patients on DENNIS (Obstructive Sleep Apnea) Protocol    Prior to receiving sedation or anesthesia, we screen all patients for Obstructive Sleep Apnea.  During your screening, you were identified as having suspected, but not confirmed Obstructive Sleep Apnea(DENNIS).    What is Obstructive Sleep Apnea?  Sleep apnea (AP-ne-ah) is a common disorder which involves breathing pauses that occur during sleep.  These can last from 10 seconds to a minute or longer.  Normal breathing resumes often with a loud snort or choking sound.    Sleep apnea occurs in all age groups and both genders but is more common in men and people over 40 years of age.  It has been estimated that as many as 18 million Americans have sleep apnea.  Most people who have sleep apnea don’t know they have it because it only occurs during sleep.  A family member and/or bed partner may first notice the signs of sleep apnea.  Sleep apnea is a chronic (ongoing) condition that disrupts the quality and quantity of your sleep repeatedly throughout the night.  This often results in excessive daytime sleepiness or fatigue during the day.  It may also contribute to high blood pressure, heart problems, and complications following medications used for surgery and procedures.    To establish a definitive diagnosis, further testing from a specialist would be needed.  We recommend that you follow up with your  primary care physician.    We recommend that you should be with an adult observer for at least 24 hours after your sedation/anesthesia.  If you have a CPAP machine, you should wear it during any sleep period (day or night) for the week following your procedure.  We encourage you to sleep on your side or in a sitting position, even with napping.  Lying flat on your back increases the risk of apnea and airway obstruction during your post procedure recovery period.    It is important to prevent over-sedation that could increase your risk for apnea.  Please take all pain medication as directed by your physician.  If you are not getting pain relief, please contact your physician to discuss possible approaches to relieving pain while minimizing medications that can affect your breathing and oxygen levels.      Peripheral Nerve Block Discharge Instructions from Same Day Surgery and Inpatient :    What to Expect - Upper Extremity  · You may experience numbness and weakness in shoulder, arm and hand  on the same side as your surgery  · This is normal. For some people, this may be an unpleasant sensation. Be very careful with your numb limb  · Ask for help when you need it  Shoulder Surgery Side Effects  · In addition to numbness and weakness you may experience other symptoms  · Other nerves that are close to those nerves injected can also be affected by local anesthesia  · You may experience a hoarseness in your voice  · Your breathing may feel different  · You may also notice drooping of your eyelid, pupil constriction, and decreased sweating, on the side of your surgery  · All of these side effects are normal and will resolve when the local anesthetic wears off   Prevent Injury  · Protect the limb like a baby  · Beware of exposing your limb to extreme heat or cold or trauma  · The limb may be injured without you noticing because it is numb  · Keep the limb elevated whenever possible  · Do not sleep on the limb  · Change  "the position of the limb regularly  · Avoid putting pressure on your surgical limb  Pain Control  · The initial block on the day of surgery will make your extremity feel \"numb\"  · Any consecutive injection including prior to discharge from the hospital will make your extremity feel \"numb\"  · You may feel an aching or burning when the local anesthesia starts to wear off  · Take pain pills as prescribed by your surgeon  · Call your surgeon or anesthesiologist if you do not have adequate pain control    "

## 2021-05-03 ENCOUNTER — HOSPITAL ENCOUNTER (OUTPATIENT)
Dept: RADIOLOGY | Facility: MEDICAL CENTER | Age: 51
End: 2021-05-03
Attending: FAMILY MEDICINE
Payer: COMMERCIAL

## 2021-05-05 ENCOUNTER — HOSPITAL ENCOUNTER (OUTPATIENT)
Dept: RADIOLOGY | Facility: MEDICAL CENTER | Age: 51
End: 2021-05-05
Attending: FAMILY MEDICINE
Payer: COMMERCIAL

## 2021-05-05 DIAGNOSIS — N20.0 URIC ACID NEPHROLITHIASIS: ICD-10-CM

## 2021-05-05 DIAGNOSIS — R10.10 UPPER ABDOMINAL PAIN: ICD-10-CM

## 2021-05-05 PROCEDURE — 74176 CT ABD & PELVIS W/O CONTRAST: CPT

## 2022-07-26 ENCOUNTER — HOSPITAL ENCOUNTER (OUTPATIENT)
Facility: MEDICAL CENTER | Age: 52
End: 2022-07-26
Attending: ORTHOPAEDIC SURGERY | Admitting: ORTHOPAEDIC SURGERY
Payer: COMMERCIAL

## 2022-09-06 ENCOUNTER — PRE-ADMISSION TESTING (OUTPATIENT)
Dept: ADMISSIONS | Facility: MEDICAL CENTER | Age: 52
End: 2022-09-06
Attending: ORTHOPAEDIC SURGERY
Payer: COMMERCIAL

## 2022-09-06 ENCOUNTER — ANESTHESIA EVENT (OUTPATIENT)
Dept: SURGERY | Facility: MEDICAL CENTER | Age: 52
End: 2022-09-06
Payer: COMMERCIAL

## 2022-09-06 NOTE — PREPROCEDURE INSTRUCTIONS
"Preadmit Phone appointment: \" Preparing for your Procedure information\" Instructions discussed with Patient.       Patient instructed to continue prescribed medications through the day before surgery, instructed to take the following medications the day of surgery per anesthesia protocol: none.        Pt states, \"no issues with anesthesia\".  Fasting guidelines discussed with Patient, patient will follow MD's instructions for Pre-Surgery Diet.      All Pt's questions and concerns answered or addressed.  Emailed all instructions.    "

## 2022-09-07 ENCOUNTER — HOSPITAL ENCOUNTER (OUTPATIENT)
Facility: MEDICAL CENTER | Age: 52
End: 2022-09-07
Attending: ORTHOPAEDIC SURGERY | Admitting: ORTHOPAEDIC SURGERY
Payer: COMMERCIAL

## 2022-09-07 ENCOUNTER — ANESTHESIA (OUTPATIENT)
Dept: SURGERY | Facility: MEDICAL CENTER | Age: 52
End: 2022-09-07
Payer: COMMERCIAL

## 2022-09-07 VITALS
SYSTOLIC BLOOD PRESSURE: 123 MMHG | RESPIRATION RATE: 19 BRPM | BODY MASS INDEX: 34.26 KG/M2 | OXYGEN SATURATION: 95 % | HEIGHT: 66 IN | TEMPERATURE: 97 F | WEIGHT: 213.19 LBS | HEART RATE: 72 BPM | DIASTOLIC BLOOD PRESSURE: 73 MMHG

## 2022-09-07 PROCEDURE — 76942 ECHO GUIDE FOR BIOPSY: CPT | Mod: 26 | Performed by: ANESTHESIOLOGY

## 2022-09-07 PROCEDURE — 160002 HCHG RECOVERY MINUTES (STAT): Performed by: ORTHOPAEDIC SURGERY

## 2022-09-07 PROCEDURE — 700111 HCHG RX REV CODE 636 W/ 250 OVERRIDE (IP): Performed by: ANESTHESIOLOGY

## 2022-09-07 PROCEDURE — 64415 NJX AA&/STRD BRCH PLXS IMG: CPT | Performed by: ORTHOPAEDIC SURGERY

## 2022-09-07 PROCEDURE — 700105 HCHG RX REV CODE 258: Performed by: ORTHOPAEDIC SURGERY

## 2022-09-07 PROCEDURE — 160025 RECOVERY II MINUTES (STATS): Performed by: ORTHOPAEDIC SURGERY

## 2022-09-07 PROCEDURE — C1713 ANCHOR/SCREW BN/BN,TIS/BN: HCPCS | Performed by: ORTHOPAEDIC SURGERY

## 2022-09-07 PROCEDURE — 700102 HCHG RX REV CODE 250 W/ 637 OVERRIDE(OP): Performed by: ANESTHESIOLOGY

## 2022-09-07 PROCEDURE — 700101 HCHG RX REV CODE 250: Performed by: ANESTHESIOLOGY

## 2022-09-07 PROCEDURE — 160046 HCHG PACU - 1ST 60 MINS PHASE II: Performed by: ORTHOPAEDIC SURGERY

## 2022-09-07 PROCEDURE — 700111 HCHG RX REV CODE 636 W/ 250 OVERRIDE (IP): Performed by: ORTHOPAEDIC SURGERY

## 2022-09-07 PROCEDURE — C1776 JOINT DEVICE (IMPLANTABLE): HCPCS | Performed by: ORTHOPAEDIC SURGERY

## 2022-09-07 PROCEDURE — 160035 HCHG PACU - 1ST 60 MINS PHASE I: Performed by: ORTHOPAEDIC SURGERY

## 2022-09-07 PROCEDURE — 160048 HCHG OR STATISTICAL LEVEL 1-5: Performed by: ORTHOPAEDIC SURGERY

## 2022-09-07 PROCEDURE — 01630 ANES OPN/ARTHR PX SHO JT NOS: CPT | Performed by: ANESTHESIOLOGY

## 2022-09-07 PROCEDURE — 160041 HCHG SURGERY MINUTES - EA ADDL 1 MIN LEVEL 4: Performed by: ORTHOPAEDIC SURGERY

## 2022-09-07 PROCEDURE — 160036 HCHG PACU - EA ADDL 30 MINS PHASE I: Performed by: ORTHOPAEDIC SURGERY

## 2022-09-07 PROCEDURE — 160009 HCHG ANES TIME/MIN: Performed by: ORTHOPAEDIC SURGERY

## 2022-09-07 PROCEDURE — A9270 NON-COVERED ITEM OR SERVICE: HCPCS | Performed by: ANESTHESIOLOGY

## 2022-09-07 PROCEDURE — 160029 HCHG SURGERY MINUTES - 1ST 30 MINS LEVEL 4: Performed by: ORTHOPAEDIC SURGERY

## 2022-09-07 PROCEDURE — 64415 NJX AA&/STRD BRCH PLXS IMG: CPT | Mod: 59 | Performed by: ANESTHESIOLOGY

## 2022-09-07 DEVICE — ANCHOR KNOTLESS REELX STT 4.5MM (5EA/BX): Type: IMPLANTABLE DEVICE | Site: SHOULDER | Status: FUNCTIONAL

## 2022-09-07 DEVICE — SYSTEM ORTHOSPACE INSPACE MEDIUM HUMERAL SPACER: Type: IMPLANTABLE DEVICE | Site: SHOULDER | Status: FUNCTIONAL

## 2022-09-07 RX ORDER — LABETALOL HYDROCHLORIDE 5 MG/ML
5 INJECTION, SOLUTION INTRAVENOUS
Status: DISCONTINUED | OUTPATIENT
Start: 2022-09-07 | End: 2022-09-07 | Stop reason: HOSPADM

## 2022-09-07 RX ORDER — HALOPERIDOL 5 MG/ML
1 INJECTION INTRAMUSCULAR
Status: DISCONTINUED | OUTPATIENT
Start: 2022-09-07 | End: 2022-09-07 | Stop reason: HOSPADM

## 2022-09-07 RX ORDER — EPINEPHRINE 1 MG/ML(1)
AMPUL (ML) INJECTION
Status: DISCONTINUED | OUTPATIENT
Start: 2022-09-07 | End: 2022-09-07 | Stop reason: HOSPADM

## 2022-09-07 RX ORDER — DIPHENHYDRAMINE HYDROCHLORIDE 50 MG/ML
12.5 INJECTION INTRAMUSCULAR; INTRAVENOUS
Status: DISCONTINUED | OUTPATIENT
Start: 2022-09-07 | End: 2022-09-07 | Stop reason: HOSPADM

## 2022-09-07 RX ORDER — KETOROLAC TROMETHAMINE 30 MG/ML
INJECTION, SOLUTION INTRAMUSCULAR; INTRAVENOUS PRN
Status: DISCONTINUED | OUTPATIENT
Start: 2022-09-07 | End: 2022-09-07 | Stop reason: SURG

## 2022-09-07 RX ORDER — MIDAZOLAM HYDROCHLORIDE 1 MG/ML
INJECTION INTRAMUSCULAR; INTRAVENOUS PRN
Status: DISCONTINUED | OUTPATIENT
Start: 2022-09-07 | End: 2022-09-07 | Stop reason: SURG

## 2022-09-07 RX ORDER — CELECOXIB 200 MG/1
200 CAPSULE ORAL ONCE
Status: COMPLETED | OUTPATIENT
Start: 2022-09-07 | End: 2022-09-07

## 2022-09-07 RX ORDER — MIDAZOLAM HYDROCHLORIDE 1 MG/ML
1 INJECTION INTRAMUSCULAR; INTRAVENOUS
Status: DISCONTINUED | OUTPATIENT
Start: 2022-09-07 | End: 2022-09-07 | Stop reason: HOSPADM

## 2022-09-07 RX ORDER — CEFAZOLIN SODIUM 1 G/3ML
INJECTION, POWDER, FOR SOLUTION INTRAMUSCULAR; INTRAVENOUS PRN
Status: DISCONTINUED | OUTPATIENT
Start: 2022-09-07 | End: 2022-09-07 | Stop reason: SURG

## 2022-09-07 RX ORDER — HYDROMORPHONE HYDROCHLORIDE 1 MG/ML
0.1 INJECTION, SOLUTION INTRAMUSCULAR; INTRAVENOUS; SUBCUTANEOUS
Status: DISCONTINUED | OUTPATIENT
Start: 2022-09-07 | End: 2022-09-07 | Stop reason: HOSPADM

## 2022-09-07 RX ORDER — METOCLOPRAMIDE HYDROCHLORIDE 5 MG/ML
INJECTION INTRAMUSCULAR; INTRAVENOUS PRN
Status: DISCONTINUED | OUTPATIENT
Start: 2022-09-07 | End: 2022-09-07 | Stop reason: SURG

## 2022-09-07 RX ORDER — HYDROMORPHONE HYDROCHLORIDE 1 MG/ML
0.4 INJECTION, SOLUTION INTRAMUSCULAR; INTRAVENOUS; SUBCUTANEOUS
Status: DISCONTINUED | OUTPATIENT
Start: 2022-09-07 | End: 2022-09-07 | Stop reason: HOSPADM

## 2022-09-07 RX ORDER — ONDANSETRON 2 MG/ML
4 INJECTION INTRAMUSCULAR; INTRAVENOUS
Status: DISCONTINUED | OUTPATIENT
Start: 2022-09-07 | End: 2022-09-07 | Stop reason: HOSPADM

## 2022-09-07 RX ORDER — DEXAMETHASONE SODIUM PHOSPHATE 4 MG/ML
INJECTION, SOLUTION INTRA-ARTICULAR; INTRALESIONAL; INTRAMUSCULAR; INTRAVENOUS; SOFT TISSUE PRN
Status: DISCONTINUED | OUTPATIENT
Start: 2022-09-07 | End: 2022-09-07 | Stop reason: SURG

## 2022-09-07 RX ORDER — MEPERIDINE HYDROCHLORIDE 25 MG/ML
6.25 INJECTION INTRAMUSCULAR; INTRAVENOUS; SUBCUTANEOUS
Status: DISCONTINUED | OUTPATIENT
Start: 2022-09-07 | End: 2022-09-07 | Stop reason: HOSPADM

## 2022-09-07 RX ORDER — ONDANSETRON 2 MG/ML
INJECTION INTRAMUSCULAR; INTRAVENOUS PRN
Status: DISCONTINUED | OUTPATIENT
Start: 2022-09-07 | End: 2022-09-07 | Stop reason: SURG

## 2022-09-07 RX ORDER — GABAPENTIN 300 MG/1
300 CAPSULE ORAL ONCE
Status: COMPLETED | OUTPATIENT
Start: 2022-09-07 | End: 2022-09-07

## 2022-09-07 RX ORDER — LIDOCAINE HYDROCHLORIDE 20 MG/ML
INJECTION, SOLUTION EPIDURAL; INFILTRATION; INTRACAUDAL; PERINEURAL PRN
Status: DISCONTINUED | OUTPATIENT
Start: 2022-09-07 | End: 2022-09-07 | Stop reason: SURG

## 2022-09-07 RX ORDER — HYDROMORPHONE HYDROCHLORIDE 1 MG/ML
0.2 INJECTION, SOLUTION INTRAMUSCULAR; INTRAVENOUS; SUBCUTANEOUS
Status: DISCONTINUED | OUTPATIENT
Start: 2022-09-07 | End: 2022-09-07 | Stop reason: HOSPADM

## 2022-09-07 RX ORDER — ACETAMINOPHEN 500 MG
1000 TABLET ORAL ONCE
Status: COMPLETED | OUTPATIENT
Start: 2022-09-07 | End: 2022-09-07

## 2022-09-07 RX ORDER — SODIUM CHLORIDE, SODIUM LACTATE, POTASSIUM CHLORIDE, CALCIUM CHLORIDE 600; 310; 30; 20 MG/100ML; MG/100ML; MG/100ML; MG/100ML
INJECTION, SOLUTION INTRAVENOUS CONTINUOUS
Status: ACTIVE | OUTPATIENT
Start: 2022-09-07 | End: 2022-09-07

## 2022-09-07 RX ORDER — BUPIVACAINE HYDROCHLORIDE AND EPINEPHRINE 5; 5 MG/ML; UG/ML
INJECTION, SOLUTION EPIDURAL; INTRACAUDAL; PERINEURAL PRN
Status: DISCONTINUED | OUTPATIENT
Start: 2022-09-07 | End: 2022-09-07 | Stop reason: SURG

## 2022-09-07 RX ORDER — SODIUM CHLORIDE, SODIUM LACTATE, POTASSIUM CHLORIDE, CALCIUM CHLORIDE 600; 310; 30; 20 MG/100ML; MG/100ML; MG/100ML; MG/100ML
INJECTION, SOLUTION INTRAVENOUS CONTINUOUS
Status: DISCONTINUED | OUTPATIENT
Start: 2022-09-07 | End: 2022-09-07 | Stop reason: HOSPADM

## 2022-09-07 RX ORDER — ALBUTEROL SULFATE 2.5 MG/3ML
2.5 SOLUTION RESPIRATORY (INHALATION)
Status: DISCONTINUED | OUTPATIENT
Start: 2022-09-07 | End: 2022-09-07 | Stop reason: HOSPADM

## 2022-09-07 RX ORDER — HYDRALAZINE HYDROCHLORIDE 20 MG/ML
5 INJECTION INTRAMUSCULAR; INTRAVENOUS
Status: DISCONTINUED | OUTPATIENT
Start: 2022-09-07 | End: 2022-09-07 | Stop reason: HOSPADM

## 2022-09-07 RX ADMIN — DEXAMETHASONE SODIUM PHOSPHATE 8 MG: 4 INJECTION, SOLUTION INTRA-ARTICULAR; INTRALESIONAL; INTRAMUSCULAR; INTRAVENOUS; SOFT TISSUE at 09:17

## 2022-09-07 RX ADMIN — GABAPENTIN 300 MG: 300 CAPSULE ORAL at 07:46

## 2022-09-07 RX ADMIN — CEFAZOLIN 2 G: 330 INJECTION, POWDER, FOR SOLUTION INTRAMUSCULAR; INTRAVENOUS at 09:08

## 2022-09-07 RX ADMIN — ROCURONIUM BROMIDE 50 MG: 10 INJECTION, SOLUTION INTRAVENOUS at 09:13

## 2022-09-07 RX ADMIN — LIDOCAINE HYDROCHLORIDE 50 MG: 20 INJECTION, SOLUTION EPIDURAL; INFILTRATION; INTRACAUDAL at 09:03

## 2022-09-07 RX ADMIN — CELECOXIB 200 MG: 200 CAPSULE ORAL at 07:45

## 2022-09-07 RX ADMIN — SUGAMMADEX 200 MG: 100 INJECTION, SOLUTION INTRAVENOUS at 10:18

## 2022-09-07 RX ADMIN — KETOROLAC TROMETHAMINE 30 MG: 30 INJECTION, SOLUTION INTRAMUSCULAR at 10:13

## 2022-09-07 RX ADMIN — ACETAMINOPHEN 1000 MG: 500 TABLET, FILM COATED ORAL at 07:46

## 2022-09-07 RX ADMIN — BUPIVACAINE HYDROCHLORIDE AND EPINEPHRINE 20 ML: 5; 5 INJECTION, SOLUTION EPIDURAL; INTRACAUDAL; PERINEURAL at 09:03

## 2022-09-07 RX ADMIN — SODIUM CHLORIDE, POTASSIUM CHLORIDE, SODIUM LACTATE AND CALCIUM CHLORIDE: 600; 310; 30; 20 INJECTION, SOLUTION INTRAVENOUS at 07:45

## 2022-09-07 RX ADMIN — MIDAZOLAM HYDROCHLORIDE 2 MG: 1 INJECTION, SOLUTION INTRAMUSCULAR; INTRAVENOUS at 08:58

## 2022-09-07 RX ADMIN — METOCLOPRAMIDE 10 MG: 5 INJECTION, SOLUTION INTRAMUSCULAR; INTRAVENOUS at 09:08

## 2022-09-07 RX ADMIN — SODIUM CHLORIDE, POTASSIUM CHLORIDE, SODIUM LACTATE AND CALCIUM CHLORIDE: 600; 310; 30; 20 INJECTION, SOLUTION INTRAVENOUS at 07:47

## 2022-09-07 RX ADMIN — FENTANYL CITRATE 100 MCG: 50 INJECTION, SOLUTION INTRAMUSCULAR; INTRAVENOUS at 09:12

## 2022-09-07 RX ADMIN — SODIUM CHLORIDE, POTASSIUM CHLORIDE, SODIUM LACTATE AND CALCIUM CHLORIDE: 600; 310; 30; 20 INJECTION, SOLUTION INTRAVENOUS at 08:58

## 2022-09-07 RX ADMIN — PROPOFOL 200 MG: 10 INJECTION, EMULSION INTRAVENOUS at 09:13

## 2022-09-07 RX ADMIN — EPHEDRINE SULFATE 10 MG: 50 INJECTION INTRAMUSCULAR; INTRAVENOUS; SUBCUTANEOUS at 09:22

## 2022-09-07 RX ADMIN — ONDANSETRON 4 MG: 2 INJECTION INTRAMUSCULAR; INTRAVENOUS at 09:42

## 2022-09-07 ASSESSMENT — PAIN SCALES - GENERAL: PAIN_LEVEL: 2

## 2022-09-07 ASSESSMENT — PAIN DESCRIPTION - PAIN TYPE: TYPE: SURGICAL PAIN

## 2022-09-07 NOTE — DISCHARGE INSTRUCTIONS
What to Expect Post Anesthesia    Rest and take it easy for the first 24 hours.  A responsible adult is recommended to remain with you during that time.  It is normal to feel sleepy.  We encourage you to not do anything that requires balance, judgment or coordination.    FOR 24 HOURS DO NOT:  Drive, operate machinery or run household appliances.  Drink beer or alcoholic beverages.  Make important decisions or sign legal documents.    To avoid nausea, slowly advance diet as tolerated, avoiding spicy or greasy foods for the first day.  Add more substantial food to your diet according to your provider's instructions.  INCREASE FLUIDS AND FIBER TO AVOID CONSTIPATION.    MILD FLU-LIKE SYMPTOMS ARE NORMAL.  YOU MAY EXPERIENCE GENERALIZED MUSCLE ACHES, THROAT IRRITATION, HEADACHE AND/OR SOME NAUSEA.    Shoulder Surgery Discharge Instructions    ACTIVITY  It is important to move your shoulder, as well as your elbow, wrist, and hand several times daily, starting the day after surgery.  You may do pendulum exercises with your operative arm starting the day after surgery.  Pendulum (dangling [place]) exercises are encouraged 2-3 times daily.  The sling will need to be removed for pendulum exercises.     NON-WEIGHT BEARING - Until Peripheral nerve block wears off    Do not lift with your injured arm.  Do not lean into or carry anything in the injured arm.  Do not use your arm to push yourself up in bed or from a chair.  Avoid pulling and pushing with the arm on your affected side.   Follow these restrictions until cleared by your follow-up provider.     SLING / SHOULDER IMMOBILIZER:  The sling should be on at all times, except when bathing and doing your demonstrated exercises.     DRESSING AND WOUND CARE    Keep your shoulder dressing clean and dry after surgery.  Be aware that some leaking of blood or fluid from your dressing can occur and is normal. You may remove your dressing 3 days after the operation.  Notice that you  have a single incision and/or several small incisions that have been closed with stitches.  Cover each of these incisions with a light dressing or band-aids.  This keeps the surgical incisions clean, as well as preventing your clothes from spotting with blood or fluid.  Change band-aids or light dressing daily.    SHOWER AND BATHING  Keep the shoulder dry for 3 days after your surgery.  Then, you may shower. You may let soap and water run over skin incisions, but do not immerse your shoulder in water.  No swimming pools, hot tubs, or baths are recommended until after your follow up and then only if cleared by your surgeon.     ICE  Apply an ice pack to your shoulder (15 minutes on the shoulder, 15 minutes off the shoulder), as you feel necessary to help with the pain and swelling.        FOLLOW-UP  Make sure that you have an appointment 7-14 days following surgery.Your procedure/rehabilitation will be discussed and physical therapy may be prescribed at that time.

## 2022-09-07 NOTE — OP REPORT
DATE OF SERVICE:  09/07/2022     PREOPERATIVE DIAGNOSIS:  Recurrent rotator cuff tear, left shoulder.     POSTOPERATIVE DIAGNOSIS:  Recurrent rotator cuff, left shoulder with extension   of the subscapularis.     PROCEDURES:  1.  Left shoulder arthroscopy with rotator cuff repair.  2.  Left shoulder arthroscopy with debridement of glenohumeral joint.  3.  Left shoulder arthroscopy with subacromial decompression.  4.  Left shoulder arthroscopy with balloon arthroplasty.     SURGEON:  Augusto Wilson MD     ASSISTANT:  James Carroll CFA     ANESTHESIA:  General endotracheal anesthesia with interscalene block.     ANESTHESIOLOGIST:  Theo Roy MD     ESTIMATED BLOOD LOSS:  Minimal.     COMPLICATIONS:  None.     INDICATIONS FOR PROCEDURE:  This is a 52-year-old male who is approximately 5   months status post a left rotator cuff tear.  The patient reported he was   progressing with activities and started having significant pain and weakness   in his shoulder.  MRI demonstrates a recurrent tear.  The patient presents for   repeat operation.  For further details of H and P, please see chart.     Risks, benefits and alternatives of procedure were discussed with the patient   and include but are not limited to bleeding, infection, and need for further   surgery, PE, DVT, blood transfusion with its associated risks, anesthesia with   its associated risks, and death.  All questions were answered.  No warranties   or guarantees were given or implied.  Consent is signed and is on chart.     DESCRIPTION OF PROCEDURE:  The patient was taken to the operating room and   placed supine on the operating table.  Prior to this, an interscalene block   had been placed in preoperative holding for postoperative pain control.    General endotracheal anesthesia was induced.  The patient was placed in a   beach chair position.  All bony prominences were well padded.  Left shoulder   and upper extremity were prepped and draped in  normal sterile fashion.  The   arm was placed in the spider arm alba.  Previous posterior portal was   identified and a spinal needle was placed in the glenohumeral joint.  This was   insufflated with 60 mL of normal saline.  Portal was incised and arthroscope   placed in the shoulder.  Anterior portal was created using outside-in   technique.  Examination of the shoulder showed complete rupture of the   previous rotator cuff repair.  There was approximately 2 cm of retraction.    There was extension into the subscapularis, which was not previously seen with   his last surgery.  This was torn off anteriorly.  It was felt this was   necessary for repair.  A shaver was placed through the anterior cannula and   the bone was roughened up.  A mattress suture was passed through the lateral   superior portion of subscapularis and a ReelX anchor was used to reapproximate   this to the bone.  This reapproximated quite nicely.  Further examination of   the cuff showed some degeneration of the glenohumeral joint with grade 2   chondromalacia of the humerus as well as of the glenoid.  There is mild   fraying of the glenoid labrum, which was cleaned up using a shaver.  The   arthroscope was then removed from the glenohumeral joint and placed in the   subacromial space.  A lateral portal was created.  Decompression of the   subacromial space was performed.  There was no significant spurring of the   acromion.  He has had a previous acromioplasty.  There was scarring of the   cuff.  This was released as much as possible.  At this point, examination of   the cuff showed reasonable excursion.  The bony footprint was identified.  The   2 previous anchors were intact with intact loops, which demonstrated that the   sutures had pulled through the tissue.  Suture was removed at this time.  The   bony bed was cleaned up using a bone resector.  Cartilage edge was advanced   slightly.  Two mattress-type stitches were then placed into the  cuff.  Two   ReelX anchors were used to reapproximate.  These 2 reapproximated quite well,   but due to the patient's humeral head elevation as well as his history of   previous tear and significant scarring of the rotator cuff, it was felt he is   an excellent candidate for balloon arthroplasty.  This was measured at a size   medium.  Medium balloon was placed and inflated with excellent results.    Motion had shown the balloon to be well contained and staying in good   position.  At this point, the arthroscope was removed from the subacromial   space.  As much fluid was removed as possible.  The portals were closed using   4-0 nylon.  The patient was placed in a soft sterile dressing and abduction   sling.  He was awakened from anesthesia and returned to recovery cart in the   recovery room in stable condition.  There were no omari or intraoperative   complications noted.        ______________________________  MD ROSIBEL Oliveira/MELODY    DD:  09/07/2022 10:28  DT:  09/07/2022 14:03    Job#:  [id number]

## 2022-09-07 NOTE — ANESTHESIA TIME REPORT
Anesthesia Start and Stop Event Times     Date Time Event    9/7/2022 0858 Anesthesia Start     0906 Ready for Procedure     1034 Anesthesia Stop        Responsible Staff  09/07/22    Name Role Begin End    Theo Roy M.D. Anesth 0858 1034        Overtime Reason:  no overtime (within assigned shift)    Comments:

## 2022-09-07 NOTE — ANESTHESIA PROCEDURE NOTES
Airway    Date/Time: 9/7/2022 9:16 AM  Performed by: Theo Roy M.D.  Authorized by: Theo Roy M.D.     Location:  OR  Urgency:  Elective  Indications for Airway Management:  Anesthesia      Spontaneous Ventilation: absent    Sedation Level:  Deep  Preoxygenated: Yes    Patient Position:  Sniffing  Mask Difficulty Assessment:  2 - vent by mask + OA or adjuvant +/- NMBA  Final Airway Type:  Endotracheal airway  Final Endotracheal Airway:  ETT  Cuffed: Yes    Technique Used for Successful ETT Placement:  Video laryngoscopy    Insertion Site:  Oral  Blade Type:  Glide  Laryngoscope Blade/Videolaryngoscope Blade Size:  3  ETT Size (mm):  7.5  Measured from:  Lips  ETT to Lips (cm):  20  Placement Verified by: auscultation and capnometry    Cormack-Lehane Classification:  Grade I - full view of glottis  Number of Attempts at Approach:  1   Atraumatic x1 attempt

## 2022-09-07 NOTE — ANESTHESIA PREPROCEDURE EVALUATION
Case: [id number] Date/Time: 09/07/22 0815    Procedures:       LEFT SHOULDER ARTHROSCOPY, SUBACROMIAL DECOMPRESSION, POSSIBLE ROTATOR CUFF REPAIR, BALLOON ARTHROPLASTY      DECOMPRESSION, SHOULDER, ARTHROSCOPIC      ARTHROPLASTY,BALLOON    Pre-op diagnosis: FULL THICKNESS ROTATOR CUFF TEAR    Location:  OR 03 / SURGERY [facility] [place]    Surgeons: MANUEL Cooper H&P:  PAST MEDICAL HISTORY:   52 y.o. male who presents for Procedure(s):  LEFT SHOULDER ARTHROSCOPY, SUBACROMIAL DECOMPRESSION, POSSIBLE ROTATOR CUFF REPAIR, BALLOON ARTHROPLASTY  DECOMPRESSION, SHOULDER, ARTHROSCOPIC  ARTHROPLASTY,BALLOON.  He has current and past medical problems significant for:    Patient denies history of seizures or strokes  Patient denies history of diabetes or thyroid disease  Patient denies history of GERD or esophageal disease  Patient denies history of DENNIS  Patient denies history of previous MI, chest pain or shortness of breath  Patient denies history of renal failure  Patient denies history of upper or lower extremity motor/sensory deficits   Patient denies history of bleeding disorders  Patient denies history of complications with anesthesia    Able to climb 2 flights of stair without dyspnea or angina, > 4 METs     Past Medical History:   Diagnosis Date   • BMI 36.0-36.9,adult 7/2/2015   • High cholesterol    • Hyperlipidemia    • Right foot pain 7/2/2015   • Snoring        SMOKING/ALCOHOL/RECREATIONAL DRUG USE:  Social History     Tobacco Use   • Smoking status: Never   • Smokeless tobacco: Former     Types: Chew     Quit date: 1990   Vaping Use   • Vaping Use: Never used   Substance Use Topics   • Alcohol use: Yes     Comment: rare, twice/month   • Drug use: No     Social History     Substance and Sexual Activity   Drug Use No       PAST SURGICAL HISTORY:  Past Surgical History:   Procedure Laterality Date   • PB SHLDR ARTHROSCOP,SURG,W/ROTAT CUFF REPB Right 11/24/2020    Procedure: ARTHROSCOPY,  SHOULDER, WITH ROTATOR CUFF REPAIR WITH DEBRIDEMENT OF GLENOHUMERAL JOINT;  Surgeon: Augusto Wilson M.D.;  Location: SURGERY [facility];  Service: Orthopedics   • PB ARTHROSCOPY SHOULDER SURGICAL BICEPS TENODES* Right 11/24/2020    Procedure: ARTHROSCOPY, SHOULDER, WITH BICEPS  TENOTOMY;  Surgeon: Augusto Wilson M.D.;  Location: SURGERY [facility];  Service: Orthopedics   • SC SHLDR ARTHROSCOP,PART ACROMIOPLAS Right 11/24/2020    Procedure: DECOMPRESSION, SUBACROMIAL SPACE;  Surgeon: Augusto Wilson M.D.;  Location: SURGERY [facility];  Service: Orthopedics   • KNEE ARTHROSCOPY Right 2018   • CATARACT PHACO WITH IOL Left 1/12/2016    Procedure: CATARACT PHACO WITH IOL ANTERIOR CHAMBER;  Surgeon: Aureliano Epperson M.D.;  Location: [facility];  Service:    • VITRECTOMY ANTERIOR Left 10/13/2015    Procedure: VITRECTOMY ANTERIOR - ANTERIOR SYNECHIALYSIS, IRIS REPAIR WITH MCCANNEL SUTURES;  Surgeon: Aureliano Epperson M.D.;  Location: [facility] ORS;  Service:    • EYE SURGERY  10/3/08    Performed by SHANTEL GARAY at SURGERY SAME DAY [facility] ORS   • EYEBALL RUPTURE REPAIR  9/19/08    Performed by SHANTEL GARAY at SURGERY [facility] ORS   • ROTATOR CUFF REPAIR Bilateral 1996    bilateral rotator cuff repairs   • TONSILLECTOMY  as a child       ALLERGIES:   No Known Allergies    MEDICATIONS:  No current facility-administered medications on file prior to encounter.     Current Outpatient Medications on File Prior to Encounter   Medication Sig Dispense Refill   • clotrimazole (LOTRIMIN) 1 % Cream as needed.     • testosterone cypionate (DEPO-TESTOSTERONE) 200 MG/ML Solution injection INJECT 1 ML (CC) INTRAMUSCULARLY EVERY TWO WEEKS     • Cyanocobalamin (VITAMIN B-12 PO) Take  by mouth every 48 hours.     • VITAMIN D PO Take  by mouth every 7 days.     • neomycin sulf/polymyx B sulf/HC soln (CORTISPORIN HC SOL) 3.5-63701-8 Solution Place 3 Drops in right ear 4 times a  day. Administer drops to both ears. 1 Bottle 0       LABS:  Lab Results   Component Value Date/Time    HEMOGLOBIN 15.8 09/19/2008 2115    HEMATOCRIT 44.1 09/19/2008 2115    WBC 6.8 09/19/2008 2115     Lab Results   Component Value Date/Time    SODIUM 137 09/19/2008 2115    POTASSIUM 3.8 09/19/2008 2115    CHLORIDE 105 09/19/2008 2115    CO2 25 09/19/2008 2115    GLUCOSE 152 (H) 09/19/2008 2115    BUN 16 09/19/2008 2115    CALCIUM 8.7 09/19/2008 2115       SARS-CoV2 result: Negative      PREVIOUS ANESTHETICS: See EMR  __________________________________________      Relevant Problems   No relevant active problems       Physical Exam    Airway   Mallampati: II  TM distance: >3 FB  Neck ROM: full       Cardiovascular - normal exam  Rhythm: regular  Rate: normal  (-) murmur     Dental - normal exam        Facial Hair   Pulmonary - normal exam  Breath sounds clear to auscultation     Abdominal   (+) obese     Neurological - normal exam                 Anesthesia Plan    ASA 2       Plan - general and peripheral nerve block     Peripheral nerve block will be post-op pain control  Airway plan will be ETT          Induction: intravenous    Postoperative Plan: Postoperative administration of opioids is intended.    Pertinent diagnostic labs and testing reviewed    Informed Consent:    Anesthetic plan and risks discussed with patient.    Use of blood products discussed with: patient whom consented to blood products.

## 2022-09-07 NOTE — ANESTHESIA PROCEDURE NOTES
Peripheral Block    Date/Time: 9/7/2022 9:03 AM  Performed by: Theo Roy M.D.  Authorized by: Theo Roy M.D.     Patient Location:  Pre-op  Start Time:  9/7/2022 9:03 AM  Reason for Block: at surgeon's request and post-op pain management ONLY    patient identified, IV checked, site marked, risks and benefits discussed, surgical consent, monitors and equipment checked, pre-op evaluation and timeout performed    Patient Position:  Supine  Prep: ChloraPrep    Monitoring:  Heart rate, continuous pulse ox and cardiac monitor  Block Region:  Upper Extremity  Upper Extremity - Block Type:  BRACHIAL PLEXUS block, Interscalene approach    Laterality:  Left  Procedures: ultrasound guided  Image captured, interpreted and electronically stored.  Local Infiltration:  Lidocaine  Strength:  1 %  Dose:  3 ml  Block Type:  Single-shot  Needle Length:  50mm  Needle Gauge:  22 G  Needle Localization:  Ultrasound guidance  Injection Assessment:  Negative aspiration for heme, no paresthesia on injection, incremental injection and local visualized surrounding nerve on ultrasound  Evidence of intravascular injection: No     US Guided Interscalene Brachial Plexus Block   US transducer placed on the neck in oblique plane approximately at the level of C6.  Anterior and Middle Scalene (MSM) muscles identified with nerve trunks identified between the muscles.  Needle inserted lateral to probe and advanced under direct visualization through the MSM into a perineural position.  After negative aspiration, LA injected with ease and visualized surrounding the nerve trunks.

## 2022-09-07 NOTE — OR SURGEON
Immediate Post OP Note    PreOp Diagnosis: recurrent rotator cuff tear left shoulder      PostOp Diagnosis: same      Procedure(s):  LEFT SHOULDER ARTHROSCOPY, SUBACROMIAL DECOMPRESSION, ROTATOR CUFF REPAIR, BALLOON ARTHROPLASTY - Wound Class: Clean      Surgeon(s):  Augusto Wilson M.D.    Anesthesiologist/Type of Anesthesia:  Anesthesiologist: Theo Roy M.D./General    Surgical Staff:  Circulator: Heather C Cogan, R.N.  Scrub Person: Live Waters  First Assist: James Carroll, C.N.AJohnathan    Specimens removed if any:  * No specimens in log *    Estimated Blood Loss: minmial    Findings: recurrent rotator cuff tear with extension into subscapularis    Complications: none        9/7/2022 10:23 AM Augusto Wilson M.D.

## 2022-09-07 NOTE — OR NURSING
1136- Patient arrived to phase 2. Patient changed out of surgical gown into clothes. Patient is A&Ox4. Patient reports no pain and no nausea. Vital signs taken and patient assessed.     1140-IV removed and discharge instructions read. All questions answered.     1200-Discharged to care of responsible adult.

## 2022-09-07 NOTE — OR NURSING
1027 - Pt arrived from OR, report received from anesthesia/RN, STEPHYS, L shoulder dressing CDI    1045 - Pt arousable, VSS, denies pain/nausea, L shoulder dressing CDI, ice pack in place    1100  - Pt resting comfortably, VSS, denies pain/nausea    1115 - Pt resting comfortably, attempted to call friend WILLIAM Vang, no pain/nausea    1130 - Pt meets criteria for transfer to Stage II, report called to Teresa RUIZ.  Raoul returned call - on his way.     1136 - Pt transferred to stage II via [facility] with belongings.

## 2022-09-07 NOTE — ANESTHESIA POSTPROCEDURE EVALUATION
Patient: Wil Schrader    Procedure Summary     Date: 09/07/22 Room / Location:  OR  / SURGERY [facility]    Anesthesia Start: 0858 Anesthesia Stop: 1034    Procedures:       LEFT SHOULDER ARTHROSCOPY, SUBACROMIAL DECOMPRESSION, ROTATOR CUFF REPAIR, BALLOON ARTHROPLASTY (Left: Shoulder)      DECOMPRESSION, SHOULDER, ARTHROSCOPIC (Left: Shoulder)      ARTHROPLASTY,BALLOON (Left: Shoulder) Diagnosis: (FULL THICKNESS ROTATOR CUFF TEAR)    Surgeons: Augusto Wilson M.D. Responsible Provider: Theo Roy M.D.    Anesthesia Type: general, peripheral nerve block ASA Status: 2          Final Anesthesia Type: general, peripheral nerve block  Last vitals  BP   Blood Pressure: 134/83, NIBP: 115/64    Temp   36 °C (96.8 °F)    Pulse   68   Resp   14    SpO2   98 %      Anesthesia Post Evaluation    Patient location during evaluation: PACU  Patient participation: complete - patient participated  Level of consciousness: awake and alert  Pain score: 2    Airway patency: patent  Anesthetic complications: no  Cardiovascular status: hemodynamically stable  Respiratory status: acceptable  Hydration status: euvolemic    PONV: none          No notable events documented.     Nurse Pain Score: 10 (NPRS)

## 2023-03-16 ENCOUNTER — HOSPITAL ENCOUNTER (OUTPATIENT)
Facility: MEDICAL CENTER | Age: 53
End: 2023-03-16
Attending: ORTHOPAEDIC SURGERY
Payer: COMMERCIAL

## 2023-03-16 LAB
GRAM STN SPEC: NORMAL
SIGNIFICANT IND 70042: NORMAL
SITE SITE: NORMAL
SOURCE SOURCE: NORMAL

## 2023-03-16 PROCEDURE — 87205 SMEAR GRAM STAIN: CPT | Mod: 91

## 2023-03-16 PROCEDURE — 87075 CULTR BACTERIA EXCEPT BLOOD: CPT

## 2023-03-16 PROCEDURE — 87076 CULTURE ANAEROBE IDENT EACH: CPT

## 2023-03-16 PROCEDURE — 87070 CULTURE OTHR SPECIMN AEROBIC: CPT

## 2023-03-16 PROCEDURE — 87102 FUNGUS ISOLATION CULTURE: CPT | Mod: 91

## 2023-03-17 LAB
FUNGUS SPEC FUNGUS STN: NORMAL
SIGNIFICANT IND 70042: NORMAL
SITE SITE: NORMAL
SOURCE SOURCE: NORMAL

## 2023-03-30 LAB
BACTERIA FLD AEROBE CULT: NORMAL
GRAM STN SPEC: NORMAL
SIGNIFICANT IND 70042: NORMAL
SITE SITE: NORMAL
SOURCE SOURCE: NORMAL

## 2023-04-04 LAB
BACTERIA FLD AEROBE CULT: NORMAL
BACTERIA SPEC ANAEROBE CULT: NORMAL
FUNGUS SPEC FUNGUS STN: NORMAL
GRAM STN SPEC: NORMAL
GRAM STN SPEC: NORMAL
SIGNIFICANT IND 70042: NORMAL
SITE SITE: NORMAL
SOURCE SOURCE: NORMAL

## 2023-04-11 LAB
FUNGUS SPEC CULT: NORMAL
FUNGUS SPEC FUNGUS STN: NORMAL
SIGNIFICANT IND 70042: NORMAL
SITE SITE: NORMAL
SOURCE SOURCE: NORMAL

## 2023-10-12 ENCOUNTER — PREP FOR PROCEDURE (OUTPATIENT)
Dept: SURGERY | Facility: MEDICAL CENTER | Age: 53
End: 2023-10-12

## 2023-10-12 DIAGNOSIS — M19.012 LOCALIZED OSTEOARTHROSIS, SHOULDER REGION, LEFT: ICD-10-CM

## 2023-10-12 DIAGNOSIS — M19.012 PRIMARY OSTEOARTHRITIS OF LEFT SHOULDER: ICD-10-CM

## 2023-10-12 RX ORDER — CEFAZOLIN SODIUM 1 G/3ML
2 INJECTION, POWDER, FOR SOLUTION INTRAMUSCULAR; INTRAVENOUS ONCE
OUTPATIENT
Start: 2023-10-12 | End: 2023-10-12

## 2024-03-01 ENCOUNTER — APPOINTMENT (OUTPATIENT)
Dept: ADMISSIONS | Facility: MEDICAL CENTER | Age: 54
End: 2024-03-01
Attending: ORTHOPAEDIC SURGERY
Payer: COMMERCIAL

## 2024-03-04 ENCOUNTER — HOSPITAL ENCOUNTER (OUTPATIENT)
Dept: RADIOLOGY | Facility: MEDICAL CENTER | Age: 54
End: 2024-03-04
Attending: ORTHOPAEDIC SURGERY
Payer: COMMERCIAL

## 2024-03-04 DIAGNOSIS — M75.122 COMPLETE TEAR OF LEFT ROTATOR CUFF, UNSPECIFIED WHETHER TRAUMATIC: ICD-10-CM

## 2024-03-04 PROCEDURE — 73200 CT UPPER EXTREMITY W/O DYE: CPT | Mod: LT

## 2024-03-07 ENCOUNTER — HOSPITAL ENCOUNTER (OUTPATIENT)
Dept: RADIOLOGY | Facility: MEDICAL CENTER | Age: 54
End: 2024-03-07
Payer: MEDICAID

## 2024-03-07 ENCOUNTER — OFFICE VISIT (OUTPATIENT)
Dept: MEDICAL GROUP | Facility: MEDICAL CENTER | Age: 54
End: 2024-03-07
Payer: MEDICAID

## 2024-03-07 ENCOUNTER — HOSPITAL ENCOUNTER (OUTPATIENT)
Dept: LAB | Facility: MEDICAL CENTER | Age: 54
End: 2024-03-07
Payer: MEDICAID

## 2024-03-07 VITALS
HEIGHT: 66 IN | OXYGEN SATURATION: 97 % | BODY MASS INDEX: 35.92 KG/M2 | HEART RATE: 78 BPM | RESPIRATION RATE: 16 BRPM | DIASTOLIC BLOOD PRESSURE: 74 MMHG | SYSTOLIC BLOOD PRESSURE: 124 MMHG | TEMPERATURE: 97 F | WEIGHT: 223.5 LBS

## 2024-03-07 DIAGNOSIS — Z13.21 SCREENING FOR ENDOCRINE, NUTRITIONAL, METABOLIC AND IMMUNITY DISORDER: ICD-10-CM

## 2024-03-07 DIAGNOSIS — Z13.29 SCREENING FOR ENDOCRINE, NUTRITIONAL, METABOLIC AND IMMUNITY DISORDER: ICD-10-CM

## 2024-03-07 DIAGNOSIS — Z13.228 SCREENING FOR ENDOCRINE, NUTRITIONAL, METABOLIC AND IMMUNITY DISORDER: ICD-10-CM

## 2024-03-07 DIAGNOSIS — Z12.11 COLON CANCER SCREENING: ICD-10-CM

## 2024-03-07 DIAGNOSIS — Z23 NEED FOR VACCINATION: ICD-10-CM

## 2024-03-07 DIAGNOSIS — Z13.0 SCREENING FOR ENDOCRINE, NUTRITIONAL, METABOLIC AND IMMUNITY DISORDER: ICD-10-CM

## 2024-03-07 DIAGNOSIS — R91.1 LUNG NODULE SEEN ON IMAGING STUDY: ICD-10-CM

## 2024-03-07 PROBLEM — R55 SYNCOPE: Status: ACTIVE | Noted: 2024-01-03

## 2024-03-07 PROBLEM — M75.120 FULL THICKNESS ROTATOR CUFF TEAR: Status: ACTIVE | Noted: 2023-08-29

## 2024-03-07 LAB
ALBUMIN SERPL BCP-MCNC: 4.5 G/DL (ref 3.2–4.9)
ALBUMIN/GLOB SERPL: 1.6 G/DL
ALP SERPL-CCNC: 60 U/L (ref 30–99)
ALT SERPL-CCNC: 23 U/L (ref 2–50)
ANION GAP SERPL CALC-SCNC: 11 MMOL/L (ref 7–16)
AST SERPL-CCNC: 19 U/L (ref 12–45)
BILIRUB SERPL-MCNC: 0.5 MG/DL (ref 0.1–1.5)
BUN SERPL-MCNC: 17 MG/DL (ref 8–22)
CALCIUM ALBUM COR SERPL-MCNC: 8.9 MG/DL (ref 8.5–10.5)
CALCIUM SERPL-MCNC: 9.3 MG/DL (ref 8.5–10.5)
CHLORIDE SERPL-SCNC: 103 MMOL/L (ref 96–112)
CHOLEST SERPL-MCNC: 244 MG/DL (ref 100–199)
CO2 SERPL-SCNC: 25 MMOL/L (ref 20–33)
CREAT SERPL-MCNC: 0.7 MG/DL (ref 0.5–1.4)
ERYTHROCYTE [DISTWIDTH] IN BLOOD BY AUTOMATED COUNT: 45.3 FL (ref 35.9–50)
EST. AVERAGE GLUCOSE BLD GHB EST-MCNC: 114 MG/DL
FASTING STATUS PATIENT QL REPORTED: NORMAL
GFR SERPLBLD CREATININE-BSD FMLA CKD-EPI: 109 ML/MIN/1.73 M 2
GLOBULIN SER CALC-MCNC: 2.9 G/DL (ref 1.9–3.5)
GLUCOSE SERPL-MCNC: 103 MG/DL (ref 65–99)
HBA1C MFR BLD: 5.6 % (ref 4–5.6)
HCT VFR BLD AUTO: 50.7 % (ref 42–52)
HCV AB SER QL: NORMAL
HDLC SERPL-MCNC: 33 MG/DL
HGB BLD-MCNC: 17.6 G/DL (ref 14–18)
HIV 1+2 AB+HIV1 P24 AG SERPL QL IA: NORMAL
LDLC SERPL CALC-MCNC: 165 MG/DL
MCH RBC QN AUTO: 28.9 PG (ref 27–33)
MCHC RBC AUTO-ENTMCNC: 34.7 G/DL (ref 32.3–36.5)
MCV RBC AUTO: 83.1 FL (ref 81.4–97.8)
PLATELET # BLD AUTO: 300 K/UL (ref 164–446)
PMV BLD AUTO: 9.4 FL (ref 9–12.9)
POTASSIUM SERPL-SCNC: 4.2 MMOL/L (ref 3.6–5.5)
PROT SERPL-MCNC: 7.4 G/DL (ref 6–8.2)
RBC # BLD AUTO: 6.1 M/UL (ref 4.7–6.1)
SODIUM SERPL-SCNC: 139 MMOL/L (ref 135–145)
TRIGL SERPL-MCNC: 231 MG/DL (ref 0–149)
WBC # BLD AUTO: 5.7 K/UL (ref 4.8–10.8)

## 2024-03-07 PROCEDURE — 87389 HIV-1 AG W/HIV-1&-2 AB AG IA: CPT

## 2024-03-07 PROCEDURE — 80061 LIPID PANEL: CPT

## 2024-03-07 PROCEDURE — 86803 HEPATITIS C AB TEST: CPT

## 2024-03-07 PROCEDURE — 83036 HEMOGLOBIN GLYCOSYLATED A1C: CPT

## 2024-03-07 PROCEDURE — 99204 OFFICE O/P NEW MOD 45 MIN: CPT | Mod: 25

## 2024-03-07 PROCEDURE — 90471 IMMUNIZATION ADMIN: CPT

## 2024-03-07 PROCEDURE — 36415 COLL VENOUS BLD VENIPUNCTURE: CPT

## 2024-03-07 PROCEDURE — 3078F DIAST BP <80 MM HG: CPT

## 2024-03-07 PROCEDURE — 3074F SYST BP LT 130 MM HG: CPT

## 2024-03-07 PROCEDURE — 80053 COMPREHEN METABOLIC PANEL: CPT

## 2024-03-07 PROCEDURE — 700117 HCHG RX CONTRAST REV CODE 255: Mod: UD

## 2024-03-07 PROCEDURE — 71260 CT THORAX DX C+: CPT

## 2024-03-07 PROCEDURE — 99213 OFFICE O/P EST LOW 20 MIN: CPT

## 2024-03-07 PROCEDURE — 85027 COMPLETE CBC AUTOMATED: CPT

## 2024-03-07 RX ADMIN — IOHEXOL 75 ML: 350 INJECTION, SOLUTION INTRAVENOUS at 18:29

## 2024-03-07 ASSESSMENT — PATIENT HEALTH QUESTIONNAIRE - PHQ9: CLINICAL INTERPRETATION OF PHQ2 SCORE: 0

## 2024-03-07 ASSESSMENT — ANXIETY QUESTIONNAIRES
IF YOU CHECKED OFF ANY PROBLEMS ON THIS QUESTIONNAIRE, HOW DIFFICULT HAVE THESE PROBLEMS MADE IT FOR YOU TO DO YOUR WORK, TAKE CARE OF THINGS AT HOME, OR GET ALONG WITH OTHER PEOPLE: NOT DIFFICULT AT ALL
7. FEELING AFRAID AS IF SOMETHING AWFUL MIGHT HAPPEN: NOT AT ALL
4. TROUBLE RELAXING: NOT AT ALL
5. BEING SO RESTLESS THAT IT IS HARD TO SIT STILL: NOT AT ALL
6. BECOMING EASILY ANNOYED OR IRRITABLE: NOT AT ALL
2. NOT BEING ABLE TO STOP OR CONTROL WORRYING: NOT AT ALL
GAD7 TOTAL SCORE: 0
1. FEELING NERVOUS, ANXIOUS, OR ON EDGE: NOT AT ALL
3. WORRYING TOO MUCH ABOUT DIFFERENT THINGS: NOT AT ALL

## 2024-03-07 NOTE — ASSESSMENT & PLAN NOTE
Patient was supposed to undergo a left shoulder replacement on 3/19/2024 with Dr Conn. Patient underwent a CT of his left shoulder on 3/4/24 that incidentally found a highly suspicious 2.3 cm spiculated left upper lobe nodule, recommend chest CT with contrast for follow-up. He was scheduled for surgery on 3/19/2024 which is being postponed until he follows up on his incidental findings. He denies any SOB, chest pain, cough, night sweats or weight loss. He has never smoked tobacco, he used to chew tobacco but quit in . His mom has a history of colon cancer. His grandfather  in his late 80's from lung cancer. His maternal grandfather was a smoker from the age of 8 y/o until he passed. His maternal grandmother had lung cancer. His father was adopted so his is unsure of their medical history. He reports little exposure to second hand smoke.

## 2024-03-07 NOTE — PROGRESS NOTES
Subjective:     CC:  Diagnoses of Lung nodule seen on imaging study, Screening for endocrine, nutritional, metabolic and immunity disorder, Need for vaccination, and Colon cancer screening were pertinent to this visit.    HISTORY OF THE PRESENT ILLNESS: Patient is a 54 y.o. male. This pleasant patient is here today to establish care.    Lung nodule seen on imaging study  Patient was supposed to undergo a left shoulder replacement on 3/19/2024 with Dr Conn. Patient underwent a CT of his left shoulder on 3/4/24 that incidentally found a highly suspicious 2.3 cm spiculated left upper lobe nodule, recommend chest CT with contrast for follow-up. He was scheduled for surgery on 3/19/2024 which is being postponed until he follows up on his incidental findings. He denies any SOB, chest pain, cough, night sweats or weight loss. He has never smoked tobacco, he used to chew tobacco but quit in . His mom has a history of colon cancer. His grandfather  in his late 80's from lung cancer. His maternal grandfather was a smoker from the age of 8 y/o until he passed. His maternal grandmother had lung cancer. His father was adopted so his is unsure of their medical history. He reports little exposure to second hand smoke.       Health Maintenance: reviewed and completed per patient preference.     ROS:   - CONSTITUTIONAL: Denies weight loss, fever and chills.  - HEENT: Denies changes in vision and hearing.  - RESPIRATORY: Denies SOB and cough.  - CV: Denies palpitations and CP.  - GI: Denies abdominal pain, nausea, vomiting and diarrhea.  - : Denies dysuria and urinary frequency.  - MSK: Endorses left shoulder myalgia and joint pain.  - SKIN: Denies rash and pruritus.  - NEUROLOGICAL: Denies headache and syncope.  - PSYCHIATRIC: Denies recent changes in mood. Denies anxiety and depression.           Social History     Socioeconomic History    Marital status: Single     Spouse name: Not on file    Number of children: Not  "on file    Years of education: Not on file    Highest education level: Not on file   Occupational History    Not on file   Tobacco Use    Smoking status: Never    Smokeless tobacco: Former     Types: Chew     Quit date: 1990   Vaping Use    Vaping Use: Never used   Substance and Sexual Activity    Alcohol use: Yes     Comment: rare, twice/month    Drug use: No    Sexual activity: Yes     Partners: Female   Other Topics Concern    Not on file   Social History Narrative    Not on file     Social Determinants of Health     Financial Resource Strain: Not on file   Food Insecurity: Not on file   Transportation Needs: Not on file   Physical Activity: Not on file   Stress: Not on file   Social Connections: Not on file   Intimate Partner Violence: Not on file   Housing Stability: Not on file      No Known Allergies         Current Outpatient Medications:     Cyanocobalamin (VITAMIN B-12 PO), Take  by mouth every 48 hours., Taking    VITAMIN D PO, Take  by mouth every 7 days., Taking   Objective:     Exam: /74 (BP Location: Left arm, Patient Position: Sitting, BP Cuff Size: Large adult)   Pulse 78   Temp 36.1 °C (97 °F) (Temporal)   Resp 16   Ht 1.676 m (5' 6\")   Wt 101 kg (223 lb 8 oz)   SpO2 97%  Body mass index is 36.07 kg/m².    Physical Exam:  Constitutional: Alert, no distress, well-groomed.  Skin: Warm, dry, good turgor, no rashes in visible areas.  Eye: Equal, round and reactive, conjunctiva clear, lids normal.  ENMT: Lips without lesions, good dentition, moist mucous membranes.  Neck: Trachea midline, no masses, no thyromegaly.  Respiratory: Unlabored respiratory effort, no cough.  Abd: soft, non tender, non distended, normal BS  MSK: Normal gait, moves all extremities.   Neuro: Grossly non-focal.   Psych: Alert and oriented x3, normal affect and mood.    Labs: Reviewed    Assessment & Plan:   54 y.o. male with the following -    1. Lung nodule seen on imaging study  Acute, etiology unsure, prognosis " uncertain. Given that it was not noted on CT thorax PE study on 1/3/2024 and radiology reports it as highly suspicious, I do feel that its appropriate to order a stat CT with contrast. I have referred him to the [facility] coordinator for further evaluation and management. He denies any red flag symptoms such as weight loss, fevers, SOB chest pain or cough. He is going to complete his labs this morning to determine kidney function for contrast imaging. Follow up precautions given.   - Referral to Intake Oncology Coordinator  - CT-CHEST (THORAX) WITH; Future    2. Screening for endocrine, nutritional, metabolic and immunity disorder  - HIV AG/AB COMBO ASSAY SCREENING; Future  - HEP C VIRUS ANTIBODY; Future  - Comp Metabolic Panel; Future  - CBC WITHOUT DIFFERENTIAL; Future  - HEMOGLOBIN A1C; Future  - Lipid Profile; Future    3. Need for vaccination  Patient requests vaccination today. Vaccine given. Patient tolerated well. Follow up precautions given.   - Tdap Vaccine =>6YO IM    4. Colon cancer screening  - Referral to GI for Colonoscopy      Return in about 3 months (around 6/7/2024) for FU Labs.    Please note that this dictation was created using voice recognition software. I have made every reasonable attempt to correct obvious errors, but I expect that there are errors of grammar and possibly content that I did not discover before finalizing the note.

## 2024-03-08 ENCOUNTER — PRE-ADMISSION TESTING (OUTPATIENT)
Dept: ADMISSIONS | Facility: MEDICAL CENTER | Age: 54
End: 2024-03-08
Attending: ORTHOPAEDIC SURGERY
Payer: COMMERCIAL

## 2024-03-08 DIAGNOSIS — Z01.812 PRE-OPERATIVE LABORATORY EXAMINATION: ICD-10-CM

## 2024-03-10 ENCOUNTER — HOSPITAL ENCOUNTER (OUTPATIENT)
Dept: RADIOLOGY | Facility: MEDICAL CENTER | Age: 54
End: 2024-03-10
Payer: MEDICAID

## 2024-03-11 ENCOUNTER — APPOINTMENT (OUTPATIENT)
Dept: ADMISSIONS | Facility: MEDICAL CENTER | Age: 54
End: 2024-03-11
Payer: COMMERCIAL

## 2024-03-11 DIAGNOSIS — Z01.812 PRE-OPERATIVE LABORATORY EXAMINATION: ICD-10-CM

## 2024-03-11 LAB
SCCMEC + MECA PNL NOSE NAA+PROBE: NEGATIVE
SCCMEC + MECA PNL NOSE NAA+PROBE: NEGATIVE

## 2024-03-11 PROCEDURE — 87641 MR-STAPH DNA AMP PROBE: CPT

## 2024-03-11 PROCEDURE — 87640 STAPH A DNA AMP PROBE: CPT

## 2024-03-12 ENCOUNTER — TELEPHONE (OUTPATIENT)
Dept: HEMATOLOGY ONCOLOGY | Facility: MEDICAL CENTER | Age: 54
End: 2024-03-12
Payer: MEDICAID

## 2024-03-12 NOTE — TELEPHONE ENCOUNTER
Incoming message received from patient wanting surgical clearance for 3/19 and and an earlier appointment. Explained to patient that the scans may be booked out and it would not be guaranteed to be resulted in time and that once provider has results, the surgical clearance would be completed by his primary care provider and surgeon once the workup is completed. Patient verbalized understanding and was okay with moving up his appointment to be seen.

## 2024-03-12 NOTE — TELEPHONE ENCOUNTER
Patient has appointment on 3/14/24.  He is calling today to see if there are any cancellations prior to Thursday, as he wants to be seen as soon as possible.    He wants to make sure that he gets surgical clearance for surgery on 3/19/24.

## 2024-03-13 ENCOUNTER — HOSPITAL ENCOUNTER (OUTPATIENT)
Dept: HEMATOLOGY ONCOLOGY | Facility: MEDICAL CENTER | Age: 54
End: 2024-03-13
Attending: NURSE PRACTITIONER
Payer: MEDICAID

## 2024-03-13 VITALS
HEART RATE: 77 BPM | SYSTOLIC BLOOD PRESSURE: 110 MMHG | TEMPERATURE: 97.4 F | HEIGHT: 67 IN | OXYGEN SATURATION: 96 % | DIASTOLIC BLOOD PRESSURE: 72 MMHG | RESPIRATION RATE: 17 BRPM | WEIGHT: 222.88 LBS | BODY MASS INDEX: 34.98 KG/M2

## 2024-03-13 DIAGNOSIS — R91.1 LUNG NODULE SEEN ON IMAGING STUDY: ICD-10-CM

## 2024-03-13 PROCEDURE — 99212 OFFICE O/P EST SF 10 MIN: CPT | Performed by: NURSE PRACTITIONER

## 2024-03-13 PROCEDURE — 99204 OFFICE O/P NEW MOD 45 MIN: CPT | Performed by: NURSE PRACTITIONER

## 2024-03-13 ASSESSMENT — ENCOUNTER SYMPTOMS
WEIGHT LOSS: 0
PALPITATIONS: 0
CHILLS: 0
NAUSEA: 0
SHORTNESS OF BREATH: 0
COUGH: 0
DIZZINESS: 0
DIARRHEA: 0
VOMITING: 0
NERVOUS/ANXIOUS: 1
CONSTIPATION: 0
MYALGIAS: 0
FEVER: 0

## 2024-03-13 ASSESSMENT — PAIN SCALES - GENERAL: PAINLEVEL: NO PAIN

## 2024-03-13 ASSESSMENT — FIBROSIS 4 INDEX: FIB4 SCORE: 0.73

## 2024-03-13 NOTE — PROGRESS NOTES
Subjective     Wil Schrader is a 54 y.o. male who presents with New Patient (IOC/Lung nodule seen on imaging study/CARLA CAMARA)        HPI    Patient referred to me, Intake Oncology Coordinator by his PCP RONNIE Long for lung nodule.  Patient is accompanied by his wife for today's visit.    Patient with significant left shoulder pain, currently on Worker's Comp.  He is scheduled to have a total shoulder replacement surgery next week, 3/19/2024.  In preparation for surgery he had a CT scan of his shoulder.  Incidental finding did note highly suspicious 2.3 cm spiculated left upper lobe lung nodule.  He subsequently was sent for CT chest with contrast completed on 3/10/2024.  CT showed a 2.1 x 1.5 cm spiculated nodule in the left upper lobe.  Radiologist did state it could be an impacted bronchus although an endobronchial lesion cannot be excluded.  There is some resolution of patchy opacity seen on prior CT chest with no evidence of new pneumonia.  Mild scarring in the right lower lobe.  No thoracic adenopathy noted.  I did personally review the imaging port images in detail, and I reviewed them with the patient today.    Clinically patient is anxious with regards to the findings but denies any significant respiratory symptoms.  He does have significant left shoulder pain.  Otherwise no other clinical complaints.    Please see past medical and surgical history below.    Patient is a never smoker.    Patient does have a family history of cancer in his mother who had colon cancer.  Maternal grandmother who had breast cancer.  Maternal grandfather who had lung cancer from smoking.    No Known Allergies  Current Outpatient Medications on File Prior to Encounter   Medication Sig Dispense Refill    Cyanocobalamin (VITAMIN B-12 PO) Take  by mouth every 48 hours.      VITAMIN D PO Take  by mouth every 7 days.       No current facility-administered medications on file prior to encounter.     Past Medical  History:   Diagnosis Date    BMI 36.0-36.9,adult 07/02/2015    High cholesterol     Hyperlipidemia     Right foot pain 07/02/2015    Snoring     Spinal headache      Past Surgical History:   Procedure Laterality Date    PB SHLDR ARTHROSCOP,SURG,W/ROTAT CUFF REPB Left 09/07/2022    Procedure: LEFT SHOULDER ARTHROSCOPY, SUBACROMIAL DECOMPRESSION, ROTATOR CUFF REPAIR, BALLOON ARTHROPLASTY;  Surgeon: Augusto Wilson M.D.;  Location: [facility];  Service: Orthopedics    FL SHLDR ARTHROSCOP,PART ACROMIOPLAS Left 09/07/2022    Procedure: DECOMPRESSION, SHOULDER, ARTHROSCOPIC;  Surgeon: Augusto Wilson M.D.;  Location: [facility];  Service: Orthopedics    FL UNLISTED PROC, ARTHROSCOPY Left 09/07/2022    Procedure: ARTHROPLASTY,BALLOON;  Surgeon: Augusto Wilson M.D.;  Location: [facility];  Service: Orthopedics    PB SHLDR ARTHROSCOP,SURG,W/ROTAT CUFF REPB Right 11/24/2020    Procedure: ARTHROSCOPY, SHOULDER, WITH ROTATOR CUFF REPAIR WITH DEBRIDEMENT OF GLENOHUMERAL JOINT;  Surgeon: Augusto Wilson M.D.;  Location: [facility];  Service: Orthopedics    PB ARTHROSCOPY SHOULDER SURGICAL BICEPS TENODES* Right 11/24/2020    Procedure: ARTHROSCOPY, SHOULDER, WITH BICEPS  TENOTOMY;  Surgeon: Augusto Wilson M.D.;  Location: [facility];  Service: Orthopedics    FL SHLDR ARTHROSCOP,PART ACROMIOPLAS Right 11/24/2020    Procedure: DECOMPRESSION, SUBACROMIAL SPACE;  Surgeon: Augusto Wilson M.D.;  Location: [facility];  Service: Orthopedics    KNEE ARTHROSCOPY Right 2018    CATARACT PHACO WITH IOL Left 01/12/2016    Procedure: CATARACT PHACO WITH IOL ANTERIOR CHAMBER;  Surgeon: Aureliano Epperson M.D.;  Location: [facility];  Service:     VITRECTOMY ANTERIOR Left 10/13/2015    Procedure: VITRECTOMY ANTERIOR - ANTERIOR SYNECHIALYSIS, IRIS REPAIR WITH MCCANNEL SUTURES;  Surgeon: Aureliano Epperson M.D.;  Location: [facility]  [facility] ORS;  Service:     EYE SURGERY  10/03/2008    Performed by SHANTEL GARAY at SURGERY SAME DAY [facility] ORS    EYEBALL RUPTURE REPAIR  09/19/2008    Performed by SHANTEL GARAY at SURGERY [facility] ORS    ROTATOR CUFF REPAIR Bilateral 1996    bilateral rotator cuff repairs    NO PERTINENT PAST SURGICAL HISTORY  2008    left eye    OTHER  2000    right shoulder    TONSILLECTOMY  as a child       Family History   Problem Relation Age of Onset    Hypertension Mother     Psychiatric Illness Mother     Colon Cancer Mother     Cancer Mother         Colon    Heart Disease Father     Thyroid Sister     Cancer Maternal Grandmother         Breast    Breast Cancer Maternal Grandmother     Cancer Maternal Grandfather         Lung from smoking    Lung Cancer Maternal Grandfather      Social History     Socioeconomic History    Marital status: Single   Tobacco Use    Smoking status: Never    Smokeless tobacco: Former     Types: Chew     Quit date: 1990   Vaping Use    Vaping Use: Never used   Substance and Sexual Activity    Alcohol use: Not Currently     Comment: rare, twice/month    Drug use: Yes     Types: Inhaled, Marijuana     Comment: Occasional    Sexual activity: Yes     Partners: Female   Social History Narrative     - on workers comp right now for his shoulder         Review of Systems   Constitutional:  Negative for chills, fever, malaise/fatigue and weight loss.   Respiratory:  Negative for cough and shortness of breath.    Cardiovascular:  Negative for chest pain, palpitations and leg swelling.   Gastrointestinal:  Negative for constipation, diarrhea, nausea and vomiting.   Genitourinary:  Negative for dysuria.   Musculoskeletal:  Positive for joint pain (left shoulder pain). Negative for myalgias.   Neurological:  Negative for dizziness.   Psychiatric/Behavioral:  The patient is nervous/anxious (regarding CT findings).               Objective     /72 (BP Location: Right arm, Patient  "Position: Sitting, BP Cuff Size: Adult)   Pulse 77   Temp 36.3 °C (97.4 °F) (Temporal)   Resp 17   Ht 1.702 m (5' 7.01\")   Wt 101 kg (222 lb 14.2 oz)   SpO2 96%   BMI 34.90 kg/m²      Physical Exam  Vitals reviewed.   Constitutional:       General: He is not in acute distress.     Appearance: Normal appearance. He is not diaphoretic.   HENT:      Head: Normocephalic and atraumatic.   Cardiovascular:      Rate and Rhythm: Normal rate and regular rhythm.      Heart sounds: Normal heart sounds. No murmur heard.     No friction rub. No gallop.   Pulmonary:      Effort: Pulmonary effort is normal. No respiratory distress.      Breath sounds: Normal breath sounds. No wheezing.   Abdominal:      General: Bowel sounds are normal. There is no distension.      Palpations: Abdomen is soft.      Tenderness: There is no abdominal tenderness.   Musculoskeletal:         General: No swelling or tenderness. Normal range of motion.   Skin:     General: Skin is warm and dry.   Neurological:      Mental Status: He is alert and oriented to person, place, and time.   Psychiatric:         Mood and Affect: Mood normal.         Behavior: Behavior normal.               CT-CHEST (THORAX) WITH    Result Date: 3/10/2024  3/7/2024 6:16 PM HISTORY/REASON FOR EXAM:  Suspicious nodule. Suspicious pulmonary nodule on shoulder CT TECHNIQUE/EXAM DESCRIPTION: CT scan of the chest with contrast. Thin-section helical images were obtained from the lung apices through the adrenal glands following the bolus administration of contrast. 75 mL of Omnipaque 350 nonionic contrast was utilized. Low dose optimization technique was utilized for this CT exam including automated exposure control and adjustment of the mA and/or kV according to patient size. COMPARISON:  Shoulder CT 3/4/2024. CT chest [facility] 1/3/2024. FINDINGS: Lungs: There is a spiculated tubular nodular opacity in the left upper lobe measuring about 2.1 x 1.5 cm. It appears mildly more " prominent in thickness with some slightly increased spiculation along the peripheral aspect compared to the CT 1/3/2024. Although it could be an impacted bronchus given the tubular appearance, an endobronchial lesion is not excluded. It also could be a small infiltrate or represent malignancy. The other previously seen patchy opacities on the prior chest CT have resolved. There is a small curvilinear calcification or postsurgical change in the superior segment of the right lower lobe. There is mild subpleural scarring in the posterior right lower lobe.. Mediastinum/Mayra: No significant adenopathy. Pleura: No pleural effusion. Cardiac: Heart normal in size without pericardial effusion. There is coronary artery calcification. Vascular: Mild scattered atherosclerosis. Soft tissues: Unremarkable. Bones: No acute or destructive process. Mild spondylosis.     1.  2.1 x 1.5 cm spiculated nodule in the left upper lobe, mildly more prominent in thickness along the peripheral aspect than on study dated 1/3/2024. It could be an impacted bronchus although an endobronchial lesion cannot be excluded. Follow-up is suggested or further characterization with PET/CT as malignancy cannot be excluded. 2.  There is otherwise resolution of the patchy opacities seen on the prior chest CT. No evidence of new pneumonia. 3.  Mild scarring in the right lower lobe. 4.  No thoracic adenopathy. Fleischner Society pulmonary nodule recommendations: Not Applicable              Assessment & Plan       1. Lung nodule seen on imaging study  [id number] BASE TO MID-THIGH    CT-BIOPSY-LUNG/MEDIASTINUM W/GUIDE LEFT              Patient with a lung nodule noted on CT in the left upper lobe found incidentally.  Discussed with patient and wife recommendation to proceed with PET/CT.  We also discussed biopsy.  Due to length of time to get both scheduled I have ordered both at the same time.  Discussed with patient and wife the plan of care and  recommendations at this time.  He did verbalize understanding is agreed with the plan.  He is anxious to get this taken care of as soon as possible so that he can move forward with his total shoulder replacement surgery.      Please note that this dictation was created using voice recognition software. I have made every reasonable attempt to correct obvious errors, but I expect that there are errors of grammar and possibly content that I did not discover before finalizing the note.

## 2024-03-14 ENCOUNTER — APPOINTMENT (OUTPATIENT)
Dept: HEMATOLOGY ONCOLOGY | Facility: MEDICAL CENTER | Age: 54
End: 2024-03-14
Payer: MEDICAID

## 2024-03-14 ENCOUNTER — APPOINTMENT (OUTPATIENT)
Dept: ADMISSIONS | Facility: MEDICAL CENTER | Age: 54
End: 2024-03-14
Attending: INTERNAL MEDICINE
Payer: MEDICAID

## 2024-03-14 NOTE — ADDENDUM NOTE
Encounter addended by: Katarina Berman, Med Ass't on: 3/13/2024 5:13 PM   Actions taken: Charge Capture section accepted

## 2024-03-18 ENCOUNTER — HOSPITAL ENCOUNTER (OUTPATIENT)
Dept: RADIOLOGY | Facility: MEDICAL CENTER | Age: 54
End: 2024-03-18
Attending: NURSE PRACTITIONER
Payer: MEDICAID

## 2024-03-18 DIAGNOSIS — R91.1 LUNG NODULE SEEN ON IMAGING STUDY: ICD-10-CM

## 2024-03-18 LAB — GLUCOSE BLD-MCNC: 88 MG/DL (ref 65–99)

## 2024-03-18 PROCEDURE — A9552 F18 FDG: HCPCS

## 2024-03-19 ENCOUNTER — PRE-ADMISSION TESTING (OUTPATIENT)
Dept: ADMISSIONS | Facility: MEDICAL CENTER | Age: 54
End: 2024-03-19
Attending: INTERNAL MEDICINE
Payer: MEDICAID

## 2024-03-21 ENCOUNTER — HOSPITAL ENCOUNTER (OUTPATIENT)
Facility: MEDICAL CENTER | Age: 54
End: 2024-03-21
Attending: INTERNAL MEDICINE | Admitting: INTERNAL MEDICINE
Payer: MEDICAID

## 2024-03-21 ENCOUNTER — APPOINTMENT (OUTPATIENT)
Dept: RADIOLOGY | Facility: MEDICAL CENTER | Age: 54
End: 2024-03-21
Attending: NURSE PRACTITIONER
Payer: MEDICAID

## 2024-03-21 ENCOUNTER — APPOINTMENT (OUTPATIENT)
Dept: RADIOLOGY | Facility: MEDICAL CENTER | Age: 54
End: 2024-03-21
Attending: STUDENT IN AN ORGANIZED HEALTH CARE EDUCATION/TRAINING PROGRAM
Payer: MEDICAID

## 2024-03-21 VITALS
DIASTOLIC BLOOD PRESSURE: 83 MMHG | HEART RATE: 68 BPM | TEMPERATURE: 98.2 F | SYSTOLIC BLOOD PRESSURE: 109 MMHG | RESPIRATION RATE: 20 BRPM | WEIGHT: 217.81 LBS | BODY MASS INDEX: 34.19 KG/M2 | OXYGEN SATURATION: 91 % | HEIGHT: 67 IN

## 2024-03-21 DIAGNOSIS — R91.1 LUNG NODULE SEEN ON IMAGING STUDY: ICD-10-CM

## 2024-03-21 LAB
INR PPP: 0.96 (ref 0.87–1.13)
PROTHROMBIN TIME: 12.8 SEC (ref 12–14.6)

## 2024-03-21 PROCEDURE — 160036 HCHG PACU - EA ADDL 30 MINS PHASE I

## 2024-03-21 PROCEDURE — 85610 PROTHROMBIN TIME: CPT

## 2024-03-21 PROCEDURE — 71045 X-RAY EXAM CHEST 1 VIEW: CPT

## 2024-03-21 PROCEDURE — 160047 HCHG PACU  - EA ADDL 30 MINS PHASE II

## 2024-03-21 PROCEDURE — 88305 TISSUE EXAM BY PATHOLOGIST: CPT

## 2024-03-21 PROCEDURE — 160035 HCHG PACU - 1ST 60 MINS PHASE I

## 2024-03-21 PROCEDURE — 700111 HCHG RX REV CODE 636 W/ 250 OVERRIDE (IP): Mod: UD

## 2024-03-21 PROCEDURE — C2613 LUNG BX PLUG W/DEL SYS: HCPCS

## 2024-03-21 PROCEDURE — 160002 HCHG RECOVERY MINUTES (STAT)

## 2024-03-21 PROCEDURE — 700111 HCHG RX REV CODE 636 W/ 250 OVERRIDE (IP): Mod: UD | Performed by: STUDENT IN AN ORGANIZED HEALTH CARE EDUCATION/TRAINING PROGRAM

## 2024-03-21 PROCEDURE — 88341 IMHCHEM/IMCYTCHM EA ADD ANTB: CPT | Mod: 91

## 2024-03-21 PROCEDURE — 4401636 CT-BIOPSY-LUNG/MEDIASTINUM W/ GUIDE

## 2024-03-21 PROCEDURE — 160046 HCHG PACU - 1ST 60 MINS PHASE II

## 2024-03-21 PROCEDURE — 88342 IMHCHEM/IMCYTCHM 1ST ANTB: CPT

## 2024-03-21 RX ORDER — MIDAZOLAM HYDROCHLORIDE 1 MG/ML
INJECTION INTRAMUSCULAR; INTRAVENOUS
Status: COMPLETED
Start: 2024-03-21 | End: 2024-03-21

## 2024-03-21 RX ORDER — SODIUM CHLORIDE 9 MG/ML
500 INJECTION, SOLUTION INTRAVENOUS
Status: ACTIVE | OUTPATIENT
Start: 2024-03-21 | End: 2024-03-21

## 2024-03-21 RX ORDER — ONDANSETRON 2 MG/ML
4 INJECTION INTRAMUSCULAR; INTRAVENOUS PRN
Status: ACTIVE | OUTPATIENT
Start: 2024-03-21 | End: 2024-03-21

## 2024-03-21 RX ORDER — SODIUM CHLORIDE 9 MG/ML
INJECTION, SOLUTION INTRAVENOUS CONTINUOUS
Status: DISCONTINUED | OUTPATIENT
Start: 2024-03-21 | End: 2024-03-21 | Stop reason: HOSPADM

## 2024-03-21 RX ORDER — MIDAZOLAM HYDROCHLORIDE 1 MG/ML
.5-2 INJECTION INTRAMUSCULAR; INTRAVENOUS PRN
Status: ACTIVE | OUTPATIENT
Start: 2024-03-21 | End: 2024-03-21

## 2024-03-21 RX ADMIN — FENTANYL CITRATE 50 MCG: 50 INJECTION, SOLUTION INTRAMUSCULAR; INTRAVENOUS at 08:28

## 2024-03-21 RX ADMIN — FENTANYL CITRATE 50 MCG: 50 INJECTION, SOLUTION INTRAMUSCULAR; INTRAVENOUS at 08:34

## 2024-03-21 RX ADMIN — MIDAZOLAM HYDROCHLORIDE 1 MG: 1 INJECTION, SOLUTION INTRAMUSCULAR; INTRAVENOUS at 08:34

## 2024-03-21 RX ADMIN — MIDAZOLAM HYDROCHLORIDE 1 MG: 1 INJECTION, SOLUTION INTRAMUSCULAR; INTRAVENOUS at 08:28

## 2024-03-21 RX ADMIN — MIDAZOLAM HYDROCHLORIDE 1 MG: 2 INJECTION, SOLUTION INTRAMUSCULAR; INTRAVENOUS at 08:28

## 2024-03-21 ASSESSMENT — PAIN DESCRIPTION - PAIN TYPE
TYPE: CHRONIC PAIN
TYPE: SURGICAL PAIN
TYPE: CHRONIC PAIN
TYPE: SURGICAL PAIN
TYPE: CHRONIC PAIN

## 2024-03-21 ASSESSMENT — FIBROSIS 4 INDEX: FIB4 SCORE: 0.73

## 2024-03-21 NOTE — PROGRESS NOTES
Pt presents to CT4. Pt was consented by MD at bedside, confirmed by this RN and consent at bedside. Pt transferred to CT table in supine position. Patient underwent a left lung nodule biopsy by Dr. Saleh. Procedure site was marked by MD and verified using imaging guidance. Pt placed on monitor, prepped and draped in a sterile fashion. Vitals were taken every 5 minutes and remained stable during procedure (see doc flow sheet for results). CO2 waveform capnography was monitored and remained WNL throughout procedure. Report called to SARA Cowan. Pt transported by stretcher with RN to PPU 3.     Specimen: 4 cores in formalin hand delivered to lab.    [employer] System  REF: [id number]  LOT: [id number]  EXP: 08/31/2026

## 2024-03-21 NOTE — OR NURSING
0854 Pt arrived to PACU with IR RN. AAOx4. Even, unlabored respirations. VSS. 0 pain. No nausea. L upper lobe biopsy dressing CDI.    0907 POC update given to Bo girlfriend at bedside. All questions answered.     1011 Xray bedside. Post procedure cxray completed.     1033 First xray resulted with no pneumo. Pt tolerated PO fluids and cracker.    1033 Pt meets Phase II criteria.     1159 Xray bedside. Second post procedure xray completed.    1215 Second xray resulted with no pneumo. Pt meets discharge criteria. Reviewed dc instructions with patient.    1225 Pt transported to car with volunteer. All belongings sent with patient.

## 2024-03-21 NOTE — OR SURGEON
Immediate Post- Operative Note        Findings: Left nodule      Procedure(s): Biopsy      Estimated Blood Loss: Less than 5 ml        Complications: None            3/21/2024     8:40 AM     Tim Saleh M.D.

## 2024-03-21 NOTE — DISCHARGE INSTRUCTIONS
If any questions arise, call your provider.  If your provider is not available, please feel free to call the Surgical Center at [phone].    MEDICATIONS: Resume taking daily medication.  Take prescribed pain medication with food.  If no medication is prescribed, you may take non-aspirin pain medication if needed.  PAIN MEDICATION CAN BE VERY CONSTIPATING.  Take a stool softener or laxative such as senokot, pericolace, or milk of magnesia if needed.    Needle Biopsy, Care After  These instructions tell you how to care for yourself after your procedure. Your doctor may give you more instructions. Call your doctor if you have any problems or questions.  What can I expect after the procedure?  After a needle biopsy, it is common to have these things at the puncture site:  Soreness.  Bruising.  Mild pain.  These things should go away after a few days.  Follow these instructions at home:  Puncture site care  Wash your hands with soap and water for at least 20 seconds before and after you change your bandage (dressing). If you cannot use soap and water, use hand .  Follow instructions from your doctor about how to take care of your puncture site. This includes:  When and how to change your bandage.  When to take off your bandage.  Check your puncture site every day for signs of infection. Check for:  Redness, swelling, or more pain.  Fluid or blood.  Warmth.  Pus or a bad smell.  General instructions  Keep dressing clean, dry, and intact for 24 hours. No shower for 24 hours. Ok to shower after 24 hours. Do not submerge site for 7 days or until cleared by doctor.  Go back to your normal activities when your doctor says that it is safe. Ask if there is anything you cannot do as you heal.  Take over-the-counter and prescription medicines only as told by your doctor.  Do not take baths, swim, or use a hot tub. Ask your doctor when you can shower.  Keep all follow-up visits. Ask if you need an appointment to get  your biopsy results.  Contact a doctor if:  You have a fever.  You have redness, swelling, or more pain at the puncture site, and it lasts longer than a few days.  You have fluid, blood, or pus coming from the puncture site.  Your puncture site feels warm.  Get help right away if:  You have very bad bleeding from the puncture site.  Summary  After the procedure, it is common to have soreness, bruising, or mild pain at the puncture site.  Check your puncture site every day for signs of infection, such as redness, swelling, or more pain.  Get help right away if you have very bad bleeding from your puncture site.  This information is not intended to replace advice given to you by your health care provider. Make sure you discuss any questions you have with your health care provider.  Document Revised: 06/08/2022 Document Reviewed: 06/08/2022  Elsevier Patient Education © 2023 [employer]

## 2024-03-25 ENCOUNTER — HOSPITAL ENCOUNTER (OUTPATIENT)
Dept: HEMATOLOGY ONCOLOGY | Facility: MEDICAL CENTER | Age: 54
End: 2024-03-25
Attending: NURSE PRACTITIONER
Payer: MEDICAID

## 2024-03-25 VITALS
OXYGEN SATURATION: 95 % | TEMPERATURE: 98 F | HEIGHT: 67 IN | DIASTOLIC BLOOD PRESSURE: 84 MMHG | BODY MASS INDEX: 34.52 KG/M2 | RESPIRATION RATE: 16 BRPM | SYSTOLIC BLOOD PRESSURE: 118 MMHG | WEIGHT: 219.91 LBS | HEART RATE: 83 BPM

## 2024-03-25 DIAGNOSIS — R94.8 ABNORMAL POSITRON EMISSION TOMOGRAPHY (PET) SCAN: ICD-10-CM

## 2024-03-25 DIAGNOSIS — R91.1 LUNG NODULE SEEN ON IMAGING STUDY: ICD-10-CM

## 2024-03-25 LAB — PATHOLOGY CONSULT NOTE: NORMAL

## 2024-03-25 PROCEDURE — 99214 OFFICE O/P EST MOD 30 MIN: CPT | Performed by: NURSE PRACTITIONER

## 2024-03-25 PROCEDURE — 99212 OFFICE O/P EST SF 10 MIN: CPT | Performed by: NURSE PRACTITIONER

## 2024-03-25 ASSESSMENT — FIBROSIS 4 INDEX: FIB4 SCORE: 0.73

## 2024-03-25 ASSESSMENT — ENCOUNTER SYMPTOMS
SPUTUM PRODUCTION: 0
NERVOUS/ANXIOUS: 1
HEMOPTYSIS: 0
SHORTNESS OF BREATH: 0
COUGH: 0
WHEEZING: 0

## 2024-03-25 ASSESSMENT — PAIN SCALES - GENERAL: PAINLEVEL: 10=SEVERE PAIN

## 2024-03-25 NOTE — PROGRESS NOTES
Subjective     Wil Schrader is a 54 y.o. male who presents with Results (IOC Est/ Follow up after PET and Lung BX)          HPI    Patient seen today in follow-up for PET/CT results as well as biopsy results.  He presents accompanied by his wife for today's visit.    Patient originally seen back on 3/13/2024 after referral from PCP for a lung nodule. Patient with significant left shoulder pain, currently on Worker's Comp. He is scheduled to have a total shoulder replacement surgery next week, 3/19/2024. In preparation for surgery he had a CT scan of his shoulder. Incidental finding did note highly suspicious 2.3 cm spiculated left upper lobe lung nodule. He subsequently was sent for CT chest with contrast completed on 3/10/2024. CT showed a 2.1 x 1.5 cm spiculated nodule in the left upper lobe. Radiologist did state it could be an impacted bronchus although an endobronchial lesion cannot be excluded. There is some resolution of patchy opacity seen on prior CT chest with no evidence of new pneumonia. Mild scarring in the right lower lobe. No thoracic adenopathy noted.     Based on these findings I did recommend PET/CT and biopsy.  Patient presents today to discuss results.  Clinically he is doing well.  He tolerated the biopsy well.  Denies any changes in his respiratory status.  Pathology of the left upper lobe lung nodule shows core biopsy of lung demonstrating a nodule with fibrosis and chronic inflammation with no evidence of malignancy.  Pathologist did state etiology is unclear however postinfectious/inflammatory changes as possible with no evidence of carcinoma.  I did discuss the pathology results in detail with the patient and his wife today.    PET/CT completed on 3/19/2024 shows no change in the left upper lobe pulmonary mass.  This had minimal activity of 4.6 SUV.  There was also some left-sided axillary adenopathy noted and very borderline enlarged at 9 mm.  This had an SUV of 5.6.  There was also  "some elevated activity in the prostate gland.  I did personally review the imaging port images in detail, and I reviewed the report in detail with the patient and his wife today.    No Known Allergies  Current Outpatient Medications on File Prior to Encounter   Medication Sig Dispense Refill    Cyanocobalamin (VITAMIN B-12 PO) Take  by mouth every 48 hours.      VITAMIN D PO Take  by mouth every 7 days.       No current facility-administered medications on file prior to encounter.       Review of Systems   Respiratory:  Negative for cough, hemoptysis, sputum production, shortness of breath and wheezing.    Musculoskeletal:  Positive for joint pain (continuned bilateral shoulder pain).   Psychiatric/Behavioral:  The patient is nervous/anxious.               Objective     /84 (BP Location: Right arm, Patient Position: Sitting, BP Cuff Size: Adult)   Pulse 83   Temp 36.7 °C (98 °F) (Temporal)   Resp 16   Ht 1.702 m (5' 7.01\")   Wt 99.7 kg (219 lb 14.5 oz)   SpO2 95%   BMI 34.43 kg/m²      Physical Exam  Vitals reviewed.   Constitutional:       General: He is not in acute distress.     Appearance: Normal appearance. He is not diaphoretic.   Cardiovascular:      Rate and Rhythm: Normal rate and regular rhythm.      Heart sounds: Normal heart sounds. No murmur heard.     No friction rub. No gallop.   Pulmonary:      Effort: Pulmonary effort is normal. No respiratory distress.      Breath sounds: Normal breath sounds. No wheezing.   Skin:     General: Skin is warm and dry.   Neurological:      Mental Status: He is alert and oriented to person, place, and time.   Psychiatric:         Mood and Affect: Mood normal.         Behavior: Behavior normal.            IMAGING   [id number] BASE TO MID-THIGH    Result Date: 3/19/2024  3/18/2024 3:28 PM HISTORY/REASON FOR EXAM:  new diagnosis of lymphoma; left upper lobe lung nodule TECHNIQUE/EXAM DESCRIPTION AND NUMBER OF VIEWS: PET body imaging. Initially, 10.69 mCi " F-18 FDG was administered intravenously under standardized conditions. Approximately 45 minutes after FDG administration, the patient was placed in the supine position on the PET CT table. Blood glucose level was 88 mg/dL. Low dose spiral CT imaging was performed from the skull base to the mid thighs. PET imaging was then performed from the skull base to the mid thighs. CT images, PET images, and PET/CT fused images were reviewed on a PACS 3D workstation. The limited non-contrast CT data are used primarily for attenuation correction and anatomic correlation.  Evaluation of solid organs and bowel are especially limited utilizing this technique. COMPARISON: CT chest 03/07/2024 FINDINGS: Head and neck: No focal areas of abnormal elevated activity are identified in the visualized portion of the head and neck. No abnormally enlarged lymph nodes are identified. No abnormal solid mass is appreciated. Chest: 2.1 x 1.5 cm mass in left upper pulmonary lobe is again identified. There is some associated elevated activity with maximum of 4.6 SUV located in the lateral posterior region of the mass. There is minimal opacification posterior edge right lung base near  the costophrenic angle which likely represents scarring and does not demonstrate elevated activity. Unchanged from prior CT. There is a left-sided axillary lymph node which is borderline enlarged with short axis diameter of 0.9 cm. There is elevated activity within a portion of the node measuring up to 5.6 SUV. Additional node more superiorly does not appear significantly enlarged and has a small focus of elevated activity measuring 4.1 SUV. No enlarged mediastinal or hilar nodes are identified. No elevated eusebio activity is noted in these regions. Abdomen: No focal areas of abnormal elevated activity are identified in the abdomen. No focal mass is appreciated in the liver spleen pancreas kidneys or adrenal glands. There is no evidence of abdominal or retroperitoneal  adenopathy. No free fluid or peritoneal inflammation is appreciated. There is diverticulosis. Pelvis: Small focus of elevated activity is noted inferiorly in the prostate gland measuring up to 45 SUV. Elevated activity at the right edge of the prostate gland measures 8.1 SUV. No adenopathy is identified. No other sites of elevated activity are identified. Skeletal: No abnormal elevated activity. No erosive or destructive lesions identified.     1.  Left upper lobe pulmonary mass is unchanged and does contain some elevated activity. Primary and metastatic carcinoma are possibilities. 2.  There are 2 separate borderline prominent lymph nodes in the left axilla containing small foci of elevated activity. Tumor involvement of these nodes is a possibility. 3.  There is elevated activity in the prostate gland. This raises the possibility of prostate carcinoma. The more inferiorly located activity is in the midline and could be physiologic activity within the prostatic urethra.    CT-CHEST (THORAX) WITH    Result Date: 3/10/2024  3/7/2024 6:16 PM HISTORY/REASON FOR EXAM:  Suspicious nodule. Suspicious pulmonary nodule on shoulder CT TECHNIQUE/EXAM DESCRIPTION: CT scan of the chest with contrast. Thin-section helical images were obtained from the lung apices through the adrenal glands following the bolus administration of contrast. 75 mL of Omnipaque 350 nonionic contrast was utilized. Low dose optimization technique was utilized for this CT exam including automated exposure control and adjustment of the mA and/or kV according to patient size. COMPARISON:  Shoulder CT 3/4/2024. CT chest [facility] 1/3/2024. FINDINGS: Lungs: There is a spiculated tubular nodular opacity in the left upper lobe measuring about 2.1 x 1.5 cm. It appears mildly more prominent in thickness with some slightly increased spiculation along the peripheral aspect compared to the CT 1/3/2024. Although it could be an impacted bronchus given the tubular  appearance, an endobronchial lesion is not excluded. It also could be a small infiltrate or represent malignancy. The other previously seen patchy opacities on the prior chest CT have resolved. There is a small curvilinear calcification or postsurgical change in the superior segment of the right lower lobe. There is mild subpleural scarring in the posterior right lower lobe.. Mediastinum/Mayra: No significant adenopathy. Pleura: No pleural effusion. Cardiac: Heart normal in size without pericardial effusion. There is coronary artery calcification. Vascular: Mild scattered atherosclerosis. Soft tissues: Unremarkable. Bones: No acute or destructive process. Mild spondylosis.     1.  2.1 x 1.5 cm spiculated nodule in the left upper lobe, mildly more prominent in thickness along the peripheral aspect than on study dated 1/3/2024. It could be an impacted bronchus although an endobronchial lesion cannot be excluded. Follow-up is suggested or further characterization with PET/CT as malignancy cannot be excluded. 2.  There is otherwise resolution of the patchy opacities seen on the prior chest CT. No evidence of new pneumonia. 3.  Mild scarring in the right lower lobe. 4.  No thoracic adenopathy. Fleischner Society pulmonary nodule recommendations: Not Applicable     CT-SHOULDER W/O PLUS RECONS LEFT    Result Date: 3/5/2024  3/4/2024 4:37 PM HISTORY/REASON FOR EXAM:  Left shoulder trauma, history of rotator cuff tear. TECHNIQUE/ EXAM DESCRIPTION AND NUMBER OF VIEWS:  CT scan of the LEFT shoulder without contrast and including reconstructions. Thin-section noncontrast helical images were obtained from the clavicle through the scapula. Coronal and sagittal reconstructions were generated. Up to date radiation dose reduction adjustments have been utilized to meet ALARA standards for radiation dose reduction. COMPARISON: None. FINDINGS:  There is a spiculated 2.3 x 1.5 cm left upper lobe pulmonary nodule on image 278 series 2.  There is joint space loss of the glenohumeral joint. The humeral head is high rating, with almost no subacromial joint space left. Previous rotator cuff repair noted. No acute fracture or dislocation. Mild bony fragmentation at the tip of the acromion. Likely from old trauma.     1. Highly suspicious 2.3 cm spiculated left upper lobe nodule, recommend chest CT with contrast for follow-up. Ordering physician was notified via 1445 hours. 2. Moderate DJD of the shoulder. 3. High riding left humeral head, consistent with chronic rotator cuff tear/impingement. 4. Bony fragmentation at the tip of the acromion, likely from old trauma.       PATHOLOGY  FINAL DIAGNOSIS:     A. Left upper lobe lung nodule:          Core biopsy of lung demonstrating a nodule of fibrosis and           chronic inflammation.          No evidence of malignancy.     Comment: The precise etiology is not clear, however post   infectious/inflammatory changes is possible. There is no evidence of   carcinoma.            Assessment & Plan       1. Lung nodule seen on imaging study  Referral to Pulmonary and Sleep Medicine      2. Abnormal positron emission tomography (PET) scan  Referral to Urology              1.  With regards to the lung nodule there was some uptake noted on PET/CT however biopsy was negative consistent with fibrosis.  However did discuss with patient that I would like for him to have continued monitoring and follow-up of this lung nodule and therefore I will go ahead and refer patient over to pulmonology for further management and monitoring of this lung nodule.  He did verbalize understanding and is agreement this plan.    2.  There was some abnormality noted on the axillary node of the left.  This is the location in which patient has significant pain and inflammation in which patient is awaiting a total shoulder replacement surgery.  These axillary nodes are very mildly enlarged measuring at 9 mm per radiologist.  Will recommend  attention to detail on further imaging and/or when approaching surgical intervention as he is hoping to get his shoulder replacement surgery as soon as possible.  He will discuss with his surgeon.      3.  Patient with an abnormal finding on PET/CT of the prostate.  I will go ahead and place a referral to urology for further evaluation of this abnormal finding.  Patient is in agreement with this plan as well.      Please note that this dictation was created using voice recognition software. I have made every reasonable attempt to correct obvious errors, but I expect that there are errors of grammar and possibly content that I did not discover before finalizing the note.

## 2024-03-29 ENCOUNTER — OFFICE VISIT (OUTPATIENT)
Dept: UROLOGY | Facility: MEDICAL CENTER | Age: 54
End: 2024-03-29
Payer: MEDICAID

## 2024-03-29 ENCOUNTER — HOSPITAL ENCOUNTER (OUTPATIENT)
Dept: LAB | Facility: MEDICAL CENTER | Age: 54
End: 2024-03-29
Attending: UROLOGY
Payer: MEDICAID

## 2024-03-29 VITALS
WEIGHT: 216.9 LBS | SYSTOLIC BLOOD PRESSURE: 125 MMHG | OXYGEN SATURATION: 96 % | BODY MASS INDEX: 34.04 KG/M2 | DIASTOLIC BLOOD PRESSURE: 84 MMHG | HEART RATE: 72 BPM | HEIGHT: 67 IN | TEMPERATURE: 98.5 F

## 2024-03-29 DIAGNOSIS — Z12.5 PROSTATE CANCER SCREENING: ICD-10-CM

## 2024-03-29 LAB — PSA SERPL-MCNC: 13 NG/ML (ref 0–4)

## 2024-03-29 PROCEDURE — 36415 COLL VENOUS BLD VENIPUNCTURE: CPT

## 2024-03-29 PROCEDURE — 84153 ASSAY OF PSA TOTAL: CPT

## 2024-03-29 ASSESSMENT — FIBROSIS 4 INDEX: FIB4 SCORE: 0.73

## 2024-03-29 NOTE — PROGRESS NOTES
Chief Complaint: abnormal lesion in prostate on PET-CT    HPI: Wil Schrader is a 54 y.o. male with a history of an incidental lesion found on PET-CT in the prostate; he was first found to have a lung mass on imaging performed prior to a planned shoulder reconstruction; subsequent biopsy was negative, but workup with PET-CT revealed hypermetabolic activity in the prostate.    He has a family history of breast and colon cancer, but no family history of known genitourinary malignancies.     He has no history of UTI, and no bothersome LUTS    Past Medical History:  Past Medical History:   Diagnosis Date    BMI 36.0-36.9,adult 07/02/2015    High cholesterol     Hyperlipidemia     Right foot pain 07/02/2015    Snoring     Spinal headache        Past Surgical History:  Past Surgical History:   Procedure Laterality Date    PB SHLDR ARTHROSCOP,SURG,W/ROTAT CUFF REPB Left 09/07/2022    Procedure: LEFT SHOULDER ARTHROSCOPY, SUBACROMIAL DECOMPRESSION, ROTATOR CUFF REPAIR, BALLOON ARTHROPLASTY;  Surgeon: Augusto Wilson M.D.;  Location: [facility];  Service: Orthopedics    GA SHLDR ARTHROSCOP,PART ACROMIOPLAS Left 09/07/2022    Procedure: DECOMPRESSION, SHOULDER, ARTHROSCOPIC;  Surgeon: Augusto Wilson M.D.;  Location: [facility];  Service: Orthopedics    GA UNLISTED PROC, ARTHROSCOPY Left 09/07/2022    Procedure: ARTHROPLASTY,BALLOON;  Surgeon: Augusto Wilson M.D.;  Location: [facility];  Service: Orthopedics    PB SHLDR ARTHROSCOP,SURG,W/ROTAT CUFF REPB Right 11/24/2020    Procedure: ARTHROSCOPY, SHOULDER, WITH ROTATOR CUFF REPAIR WITH DEBRIDEMENT OF GLENOHUMERAL JOINT;  Surgeon: Augusto Wilson M.D.;  Location: [facility];  Service: Orthopedics    PB ARTHROSCOPY SHOULDER SURGICAL BICEPS TENODES* Right 11/24/2020    Procedure: ARTHROSCOPY, SHOULDER, WITH BICEPS  TENOTOMY;  Surgeon: Augusto Wilson M.D.;  Location: [facility];  Service:  Orthopedics    LA SHLDR ARTHROSCOP,PART ACROMIOPLAS Right 11/24/2020    Procedure: DECOMPRESSION, SUBACROMIAL SPACE;  Surgeon: Augusto Wilson M.D.;  Location: SURGERY [facility];  Service: Orthopedics    KNEE ARTHROSCOPY Right 2018    CATARACT PHACO WITH IOL Left 01/12/2016    Procedure: CATARACT PHACO WITH IOL ANTERIOR CHAMBER;  Surgeon: Aureliano Epperson M.D.;  Location: SURGERY [facility];  Service:     VITRECTOMY ANTERIOR Left 10/13/2015    Procedure: VITRECTOMY ANTERIOR - ANTERIOR SYNECHIALYSIS, IRIS REPAIR WITH MCCANNEL SUTURES;  Surgeon: Aureliano Epperson M.D.;  Location: SURGERY [facility];  Service:     EYE SURGERY  10/03/2008    Performed by SHANTEL GARAY at SURGERY SAME DAY [facility] ORS    EYEBALL RUPTURE REPAIR  09/19/2008    Performed by SHANTEL GARAY at SURGERY [facility] ORS    ROTATOR CUFF REPAIR Bilateral 1996    bilateral rotator cuff repairs    NO PERTINENT PAST SURGICAL HISTORY  2008    left eye    OTHER  2000    right shoulder    TONSILLECTOMY  as a child       Family History:  Family History   Problem Relation Age of Onset    Hypertension Mother     Psychiatric Illness Mother     Colon Cancer Mother     Cancer Mother         Colon    Heart Disease Father     Thyroid Sister     Cancer Maternal Grandmother         Breast    Breast Cancer Maternal Grandmother     Cancer Maternal Grandfather         Lung from smoking    Lung Cancer Maternal Grandfather        Social History:  Social History     Socioeconomic History    Marital status: Single     Spouse name: Not on file    Number of children: Not on file    Years of education: Not on file    Highest education level: Not on file   Occupational History    Not on file   Tobacco Use    Smoking status: Never    Smokeless tobacco: Former     Types: Chew     Quit date: 1990   Vaping Use    Vaping Use: Never used   Substance and Sexual Activity    Alcohol use: Not Currently     Comment: rare, twice/month    Drug use: Yes      Types: Inhaled, Marijuana     Comment: Occasional    Sexual activity: Yes     Partners: Female   Other Topics Concern    Not on file   Social History Narrative     - on workers comp right now for his shoulder     Social Determinants of Health     Financial Resource Strain: Not on file   Food Insecurity: Not on file   Transportation Needs: Not on file   Physical Activity: Not on file   Stress: Not on file   Social Connections: Not on file   Intimate Partner Violence: Not on file   Housing Stability: Not on file       Medications:  Current Outpatient Medications   Medication Sig Dispense Refill    Cholecalciferol (VITAMIN D-3 PO) Take  by mouth.      Cyanocobalamin (VITAMIN B-12 PO) Take  by mouth every 48 hours.      VITAMIN D PO Take  by mouth every 7 days. (Patient not taking: Reported on 3/29/2024)       No current facility-administered medications for this visit.       Allergies:  No Known Allergies    Review of Systems:  Constitutional: Negative for fever, chills and malaise/fatigue.   HENT: Negative for congestion.    Eyes: Negative for pain.   Respiratory: Negative for cough and shortness of breath.    Cardiovascular: Negative for leg swelling.   Gastrointestinal: Negative for nausea, vomiting, abdominal pain and diarrhea.   Genitourinary: Negative for dysuria and hematuria.   Skin: Negative for rash.   Neurological: Negative for dizziness, focal weakness and headaches.   Endo/Heme/Allergies: Does not bruise/bleed easily.   Psychiatric/Behavioral: Positive for severe stress; dealing with loss of son    Physical Exam:  Vitals:    03/29/24 1450   BP: 125/84   Pulse: 72   Temp: 36.9 °C (98.5 °F)   SpO2: 96%       GENERAL: well appearing, well nourished, NAD  RESP: respiratory effort normal  ABDOMEN: soft, nontender, nondistended, no masses or organomegaly  HERNIAS: no hernias found on exam  SKIN/LYMPH: normal coloration and turgor, no suspicious skin lesions noted  NEURO/PSYCH: alert, oriented,  "normal speech, no focal findings or movement disorder noted  EXTREMITIES: peripheral pulses normal, no pedal edema, no clubbing or cyanosis  GENITOURINARY: normal male external genitalia  RECTAL: Normal rectal tone,, no anorectal masses, Prostate, normal size, consistency; approx. 40 Grams in size. No clear nodules, though the texture of the prostate is firmer on the right than the left.     Data Review:    Labs:  POCT UA No results found for: \"POCCOLOR\", \"POCAPPEAR\", \"POCLEUKEST\", \"POCNITRITE\", \"POCUROBILIGE\", \"POCPROTEIN\", \"POCURPH\", \"POCBLOOD\", \"POCSPGRV\", \"POCKETONES\", \"POCBILIRUBIN\", \"POCGLUCUA\"   CBC   Lab Results   Component Value Date/Time    WBC 5.7 03/07/2024 0907    RBC 6.10 03/07/2024 0907    HEMOGLOBIN 17.6 03/07/2024 0907    HEMATOCRIT 50.7 03/07/2024 0907    MCV 83.1 03/07/2024 0907    MCH 28.9 03/07/2024 0907    MCHC 34.7 03/07/2024 0907    RDW 45.3 03/07/2024 0907    MPV 9.4 03/07/2024 0907    LYMPHOCYTES 21.2 (L) 09/19/2008 2115    MONOCYTES 8.5 09/19/2008 2115    EOSINOPHILS 1.9 09/19/2008 2115    BASOPHILS 0.5 09/19/2008 2115       CMP   Lab Results   Component Value Date/Time    SODIUM 139 03/07/2024 0907    POTASSIUM 4.2 03/07/2024 0907    CHLORIDE 103 03/07/2024 0907    CO2 25 03/07/2024 0907    ANION 11.0 03/07/2024 0907    GLUCOSE 103 (H) 03/07/2024 0907    BUN 17 03/07/2024 0907    CREATININE 0.70 03/07/2024 0907    GFRCKD 109 03/07/2024 0907    CALCIUM 9.3 03/07/2024 0907    CORRCALC 8.9 03/07/2024 0907    ASTSGOT 19 03/07/2024 0907    ALTSGPT 23 03/07/2024 0907    ALKPHOSPHAT 60 03/07/2024 0907    TBILIRUBIN 0.5 03/07/2024 0907    ALBUMIN 4.5 03/07/2024 0907    TOTPROTEIN 7.4 03/07/2024 0907    GLOBULIN 2.9 03/07/2024 0907    AGRATIO 1.6 03/07/2024 0907     PSA No results found for: \"PSATOTAL\"    Imaging:   [id number] BASE TO MID-THIGH  Order: [id number]   Status: Final result       Visible to patient: Yes (seen)       Next appt: None       Dx: Lung nodule seen on imaging study    0 " Result Notes  Details    Reading Physician Reading Date Result Priority   Andrew Winters M.D.  [phone] 3/19/2024      Narrative & Impression     3/18/2024 3:28 PM     HISTORY/REASON FOR EXAM:  new diagnosis of lymphoma; left upper lobe lung nodule        TECHNIQUE/EXAM DESCRIPTION AND NUMBER OF VIEWS:  PET body imaging.     Initially, 10.69 mCi F-18 FDG was administered intravenously under standardized conditions. Approximately 45 minutes after FDG administration, the patient was placed in the supine position on the PET CT table. Blood glucose level was 88 mg/dL.     Low dose spiral CT imaging was performed from the skull base to the mid thighs.     PET imaging was then performed from the skull base to the mid thighs. CT images, PET images, and PET/CT fused images were reviewed on a PACS 3D workstation.     The limited non-contrast CT data are used primarily for attenuation correction and anatomic correlation.  Evaluation of solid organs and bowel are especially limited utilizing this technique.        COMPARISON: CT chest 03/07/2024     FINDINGS:  Head and neck:  No focal areas of abnormal elevated activity are identified in the visualized portion of the head and neck. No abnormally enlarged lymph nodes are identified. No abnormal solid mass is appreciated.     Chest:  2.1 x 1.5 cm mass in left upper pulmonary lobe is again identified. There is some associated elevated activity with maximum of 4.6 SUV located in the lateral posterior region of the mass. There is minimal opacification posterior edge right lung base near   the costophrenic angle which likely represents scarring and does not demonstrate elevated activity. Unchanged from prior CT. There is a left-sided axillary lymph node which is borderline enlarged with short axis diameter of 0.9 cm. There is elevated   activity within a portion of the node measuring up to 5.6 SUV. Additional node more superiorly does not appear significantly enlarged and has  a small focus of elevated activity measuring 4.1 SUV. No enlarged mediastinal or hilar nodes are identified. No   elevated eusebio activity is noted in these regions.     Abdomen:  No focal areas of abnormal elevated activity are identified in the abdomen.  No focal mass is appreciated in the liver spleen pancreas kidneys or adrenal glands.  There is no evidence of abdominal or retroperitoneal adenopathy.  No free fluid or peritoneal inflammation is appreciated. There is diverticulosis.     Pelvis:  Small focus of elevated activity is noted inferiorly in the prostate gland measuring up to 45 SUV. Elevated activity at the right edge of the prostate gland measures 8.1 SUV. No adenopathy is identified. No other sites of elevated activity are   identified.     Skeletal:  No abnormal elevated activity. No erosive or destructive lesions identified.     IMPRESSION:     1.  Left upper lobe pulmonary mass is unchanged and does contain some elevated activity. Primary and metastatic carcinoma are possibilities.     2.  There are 2 separate borderline prominent lymph nodes in the left axilla containing small foci of elevated activity. Tumor involvement of these nodes is a possibility.     3.  There is elevated activity in the prostate gland. This raises the possibility of prostate carcinoma. The more inferiorly located activity is in the midline and could be physiologic activity within the prostatic urethra.         Assessment: 54 y.o. male with a history of an incidental finding on PET-CT of elevated metabolic activity; he has no prior PSA levels and has a normal ZIYAD today. No known family history of prostate cancer.    I reviewed the images and it does appear that in the left mid to apex of the gland there is abnormal uptake, most of this is adjacent to the urethra.     We discussed the differential diagnosis of this finding, including infection, inflammation, and malignancy. He's had no known UTI's nor any current symptoms of  dysuria, urgency, or frequency.       Plan:    -PSA  -MRI prostate protocol  -Will call with results and decide on biopsy pending results      Kar Jin MD

## 2024-04-08 ENCOUNTER — APPOINTMENT (OUTPATIENT)
Dept: ADMISSIONS | Facility: MEDICAL CENTER | Age: 54
End: 2024-04-08
Attending: ORTHOPAEDIC SURGERY
Payer: COMMERCIAL

## 2024-04-08 VITALS — HEIGHT: 66 IN | BODY MASS INDEX: 35.01 KG/M2

## 2024-04-08 NOTE — PREPROCEDURE INSTRUCTIONS
Pre-admit telephone appointment completed. Reviewed the Preparing for your procedure handout with patient over the phone.  Patient instructed per pharmacy guidelines regarding taking, holding, or contacting provider for instructions on regularly prescribed medications before surgery.

## 2024-04-09 ENCOUNTER — ANESTHESIA (OUTPATIENT)
Dept: SURGERY | Facility: MEDICAL CENTER | Age: 54
End: 2024-04-09
Payer: COMMERCIAL

## 2024-04-09 ENCOUNTER — APPOINTMENT (OUTPATIENT)
Dept: RADIOLOGY | Facility: MEDICAL CENTER | Age: 54
End: 2024-04-09
Attending: ORTHOPAEDIC SURGERY
Payer: COMMERCIAL

## 2024-04-09 ENCOUNTER — HOSPITAL ENCOUNTER (OUTPATIENT)
Facility: MEDICAL CENTER | Age: 54
End: 2024-04-09
Attending: ORTHOPAEDIC SURGERY | Admitting: ORTHOPAEDIC SURGERY
Payer: COMMERCIAL

## 2024-04-09 ENCOUNTER — ANESTHESIA EVENT (OUTPATIENT)
Dept: SURGERY | Facility: MEDICAL CENTER | Age: 54
End: 2024-04-09
Payer: COMMERCIAL

## 2024-04-09 VITALS
BODY MASS INDEX: 34.58 KG/M2 | DIASTOLIC BLOOD PRESSURE: 50 MMHG | HEIGHT: 66 IN | SYSTOLIC BLOOD PRESSURE: 126 MMHG | WEIGHT: 215.17 LBS | HEART RATE: 80 BPM | OXYGEN SATURATION: 92 % | RESPIRATION RATE: 16 BRPM | TEMPERATURE: 97.2 F

## 2024-04-09 PROBLEM — G47.30 SLEEP APNEA: Status: ACTIVE | Noted: 2024-04-09

## 2024-04-09 LAB — EKG IMPRESSION: NORMAL

## 2024-04-09 PROCEDURE — 502240 HCHG MISC OR SUPPLY RC 0272: Performed by: ORTHOPAEDIC SURGERY

## 2024-04-09 PROCEDURE — 700101 HCHG RX REV CODE 250: Performed by: ANESTHESIOLOGY

## 2024-04-09 PROCEDURE — 160002 HCHG RECOVERY MINUTES (STAT): Performed by: ORTHOPAEDIC SURGERY

## 2024-04-09 PROCEDURE — 93010 ELECTROCARDIOGRAM REPORT: CPT | Performed by: INTERNAL MEDICINE

## 2024-04-09 PROCEDURE — 93005 ELECTROCARDIOGRAM TRACING: CPT | Performed by: ORTHOPAEDIC SURGERY

## 2024-04-09 PROCEDURE — 502000 HCHG MISC OR IMPLANTS RC 0278: Performed by: ORTHOPAEDIC SURGERY

## 2024-04-09 PROCEDURE — 700105 HCHG RX REV CODE 258: Performed by: ORTHOPAEDIC SURGERY

## 2024-04-09 PROCEDURE — 97165 OT EVAL LOW COMPLEX 30 MIN: CPT

## 2024-04-09 PROCEDURE — 700102 HCHG RX REV CODE 250 W/ 637 OVERRIDE(OP): Performed by: ANESTHESIOLOGY

## 2024-04-09 PROCEDURE — 73020 X-RAY EXAM OF SHOULDER: CPT | Mod: LT

## 2024-04-09 PROCEDURE — C9290 INJ, BUPIVACAINE LIPOSOME: HCPCS | Performed by: ANESTHESIOLOGY

## 2024-04-09 PROCEDURE — 160048 HCHG OR STATISTICAL LEVEL 1-5: Performed by: ORTHOPAEDIC SURGERY

## 2024-04-09 PROCEDURE — C1776 JOINT DEVICE (IMPLANTABLE): HCPCS | Performed by: ORTHOPAEDIC SURGERY

## 2024-04-09 PROCEDURE — 64415 NJX AA&/STRD BRCH PLXS IMG: CPT | Performed by: ORTHOPAEDIC SURGERY

## 2024-04-09 PROCEDURE — 700111 HCHG RX REV CODE 636 W/ 250 OVERRIDE (IP): Mod: JZ | Performed by: ORTHOPAEDIC SURGERY

## 2024-04-09 PROCEDURE — 700111 HCHG RX REV CODE 636 W/ 250 OVERRIDE (IP): Performed by: ANESTHESIOLOGY

## 2024-04-09 PROCEDURE — C1713 ANCHOR/SCREW BN/BN,TIS/BN: HCPCS | Performed by: ORTHOPAEDIC SURGERY

## 2024-04-09 PROCEDURE — 160041 HCHG SURGERY MINUTES - EA ADDL 1 MIN LEVEL 4: Performed by: ORTHOPAEDIC SURGERY

## 2024-04-09 PROCEDURE — 160029 HCHG SURGERY MINUTES - 1ST 30 MINS LEVEL 4: Performed by: ORTHOPAEDIC SURGERY

## 2024-04-09 PROCEDURE — 97535 SELF CARE MNGMENT TRAINING: CPT

## 2024-04-09 PROCEDURE — 160046 HCHG PACU - 1ST 60 MINS PHASE II: Performed by: ORTHOPAEDIC SURGERY

## 2024-04-09 PROCEDURE — A9270 NON-COVERED ITEM OR SERVICE: HCPCS | Performed by: ANESTHESIOLOGY

## 2024-04-09 PROCEDURE — 160025 RECOVERY II MINUTES (STATS): Performed by: ORTHOPAEDIC SURGERY

## 2024-04-09 PROCEDURE — 160009 HCHG ANES TIME/MIN: Performed by: ORTHOPAEDIC SURGERY

## 2024-04-09 PROCEDURE — 160047 HCHG PACU  - EA ADDL 30 MINS PHASE II: Performed by: ORTHOPAEDIC SURGERY

## 2024-04-09 PROCEDURE — 160035 HCHG PACU - 1ST 60 MINS PHASE I: Performed by: ORTHOPAEDIC SURGERY

## 2024-04-09 RX ORDER — MIDAZOLAM HYDROCHLORIDE 1 MG/ML
1 INJECTION INTRAMUSCULAR; INTRAVENOUS
Status: DISCONTINUED | OUTPATIENT
Start: 2024-04-09 | End: 2024-04-09 | Stop reason: HOSPADM

## 2024-04-09 RX ORDER — HYDROMORPHONE HYDROCHLORIDE 1 MG/ML
0.4 INJECTION, SOLUTION INTRAMUSCULAR; INTRAVENOUS; SUBCUTANEOUS
Status: DISCONTINUED | OUTPATIENT
Start: 2024-04-09 | End: 2024-04-09 | Stop reason: HOSPADM

## 2024-04-09 RX ORDER — CELECOXIB 200 MG/1
200 CAPSULE ORAL ONCE
Status: COMPLETED | OUTPATIENT
Start: 2024-04-09 | End: 2024-04-09

## 2024-04-09 RX ORDER — OXYCODONE HCL 5 MG/5 ML
10 SOLUTION, ORAL ORAL
Status: COMPLETED | OUTPATIENT
Start: 2024-04-09 | End: 2024-04-09

## 2024-04-09 RX ORDER — ROCURONIUM BROMIDE 10 MG/ML
INJECTION, SOLUTION INTRAVENOUS PRN
Status: DISCONTINUED | OUTPATIENT
Start: 2024-04-09 | End: 2024-04-09 | Stop reason: SURG

## 2024-04-09 RX ORDER — BUPIVACAINE HYDROCHLORIDE 5 MG/ML
INJECTION, SOLUTION EPIDURAL; INTRACAUDAL PRN
Status: DISCONTINUED | OUTPATIENT
Start: 2024-04-09 | End: 2024-04-09 | Stop reason: SURG

## 2024-04-09 RX ORDER — HYDROMORPHONE HYDROCHLORIDE 1 MG/ML
0.1 INJECTION, SOLUTION INTRAMUSCULAR; INTRAVENOUS; SUBCUTANEOUS
Status: DISCONTINUED | OUTPATIENT
Start: 2024-04-09 | End: 2024-04-09 | Stop reason: HOSPADM

## 2024-04-09 RX ORDER — HYDROMORPHONE HYDROCHLORIDE 1 MG/ML
0.2 INJECTION, SOLUTION INTRAMUSCULAR; INTRAVENOUS; SUBCUTANEOUS
Status: DISCONTINUED | OUTPATIENT
Start: 2024-04-09 | End: 2024-04-09 | Stop reason: HOSPADM

## 2024-04-09 RX ORDER — ONDANSETRON 2 MG/ML
4 INJECTION INTRAMUSCULAR; INTRAVENOUS
Status: DISCONTINUED | OUTPATIENT
Start: 2024-04-09 | End: 2024-04-09 | Stop reason: HOSPADM

## 2024-04-09 RX ORDER — VANCOMYCIN HYDROCHLORIDE 1 G/20ML
INJECTION, POWDER, LYOPHILIZED, FOR SOLUTION INTRAVENOUS PRN
Status: DISCONTINUED | OUTPATIENT
Start: 2024-04-09 | End: 2024-04-09 | Stop reason: SURG

## 2024-04-09 RX ORDER — HYDRALAZINE HYDROCHLORIDE 20 MG/ML
5 INJECTION INTRAMUSCULAR; INTRAVENOUS
Status: DISCONTINUED | OUTPATIENT
Start: 2024-04-09 | End: 2024-04-09 | Stop reason: HOSPADM

## 2024-04-09 RX ORDER — SODIUM CHLORIDE, SODIUM LACTATE, POTASSIUM CHLORIDE, CALCIUM CHLORIDE 600; 310; 30; 20 MG/100ML; MG/100ML; MG/100ML; MG/100ML
INJECTION, SOLUTION INTRAVENOUS CONTINUOUS
Status: ACTIVE | OUTPATIENT
Start: 2024-04-09 | End: 2024-04-09

## 2024-04-09 RX ORDER — DEXAMETHASONE SODIUM PHOSPHATE 4 MG/ML
INJECTION, SOLUTION INTRA-ARTICULAR; INTRALESIONAL; INTRAMUSCULAR; INTRAVENOUS; SOFT TISSUE PRN
Status: DISCONTINUED | OUTPATIENT
Start: 2024-04-09 | End: 2024-04-09 | Stop reason: SURG

## 2024-04-09 RX ORDER — ONDANSETRON 2 MG/ML
INJECTION INTRAMUSCULAR; INTRAVENOUS PRN
Status: DISCONTINUED | OUTPATIENT
Start: 2024-04-09 | End: 2024-04-09 | Stop reason: SURG

## 2024-04-09 RX ORDER — DIPHENHYDRAMINE HYDROCHLORIDE 50 MG/ML
12.5 INJECTION INTRAMUSCULAR; INTRAVENOUS
Status: DISCONTINUED | OUTPATIENT
Start: 2024-04-09 | End: 2024-04-09 | Stop reason: HOSPADM

## 2024-04-09 RX ORDER — SODIUM CHLORIDE, SODIUM LACTATE, POTASSIUM CHLORIDE, CALCIUM CHLORIDE 600; 310; 30; 20 MG/100ML; MG/100ML; MG/100ML; MG/100ML
INJECTION, SOLUTION INTRAVENOUS CONTINUOUS
Status: DISCONTINUED | OUTPATIENT
Start: 2024-04-09 | End: 2024-04-09 | Stop reason: HOSPADM

## 2024-04-09 RX ORDER — TRANEXAMIC ACID 100 MG/ML
INJECTION, SOLUTION INTRAVENOUS PRN
Status: DISCONTINUED | OUTPATIENT
Start: 2024-04-09 | End: 2024-04-09 | Stop reason: SURG

## 2024-04-09 RX ORDER — MIDAZOLAM HYDROCHLORIDE 1 MG/ML
INJECTION INTRAMUSCULAR; INTRAVENOUS
Status: COMPLETED
Start: 2024-04-09 | End: 2024-04-09

## 2024-04-09 RX ORDER — EPHEDRINE SULFATE 50 MG/ML
5 INJECTION, SOLUTION INTRAVENOUS
Status: DISCONTINUED | OUTPATIENT
Start: 2024-04-09 | End: 2024-04-09 | Stop reason: HOSPADM

## 2024-04-09 RX ORDER — MIDAZOLAM HYDROCHLORIDE 1 MG/ML
INJECTION INTRAMUSCULAR; INTRAVENOUS PRN
Status: DISCONTINUED | OUTPATIENT
Start: 2024-04-09 | End: 2024-04-09 | Stop reason: SURG

## 2024-04-09 RX ORDER — MEPERIDINE HYDROCHLORIDE 25 MG/ML
6.25 INJECTION INTRAMUSCULAR; INTRAVENOUS; SUBCUTANEOUS
Status: DISCONTINUED | OUTPATIENT
Start: 2024-04-09 | End: 2024-04-09 | Stop reason: HOSPADM

## 2024-04-09 RX ORDER — OXYCODONE HCL 5 MG/5 ML
5 SOLUTION, ORAL ORAL
Status: COMPLETED | OUTPATIENT
Start: 2024-04-09 | End: 2024-04-09

## 2024-04-09 RX ORDER — ACETAMINOPHEN 500 MG
1000 TABLET ORAL ONCE
Status: COMPLETED | OUTPATIENT
Start: 2024-04-09 | End: 2024-04-09

## 2024-04-09 RX ORDER — EPHEDRINE SULFATE 50 MG/ML
INJECTION, SOLUTION INTRAVENOUS PRN
Status: DISCONTINUED | OUTPATIENT
Start: 2024-04-09 | End: 2024-04-09 | Stop reason: SURG

## 2024-04-09 RX ORDER — CEFAZOLIN SODIUM 1 G/3ML
INJECTION, POWDER, FOR SOLUTION INTRAMUSCULAR; INTRAVENOUS PRN
Status: DISCONTINUED | OUTPATIENT
Start: 2024-04-09 | End: 2024-04-09 | Stop reason: SURG

## 2024-04-09 RX ORDER — HALOPERIDOL 5 MG/ML
1 INJECTION INTRAMUSCULAR
Status: DISCONTINUED | OUTPATIENT
Start: 2024-04-09 | End: 2024-04-09 | Stop reason: HOSPADM

## 2024-04-09 RX ORDER — VANCOMYCIN HYDROCHLORIDE 1 G/20ML
INJECTION, POWDER, LYOPHILIZED, FOR SOLUTION INTRAVENOUS
Status: COMPLETED | OUTPATIENT
Start: 2024-04-09 | End: 2024-04-09

## 2024-04-09 RX ORDER — LABETALOL HYDROCHLORIDE 5 MG/ML
5 INJECTION, SOLUTION INTRAVENOUS
Status: DISCONTINUED | OUTPATIENT
Start: 2024-04-09 | End: 2024-04-09 | Stop reason: HOSPADM

## 2024-04-09 RX ADMIN — ROCURONIUM BROMIDE 30 MG: 50 INJECTION, SOLUTION INTRAVENOUS at 15:38

## 2024-04-09 RX ADMIN — FENTANYL CITRATE 50 MCG: 50 INJECTION, SOLUTION INTRAMUSCULAR; INTRAVENOUS at 16:00

## 2024-04-09 RX ADMIN — FENTANYL CITRATE 50 MCG: 50 INJECTION, SOLUTION INTRAMUSCULAR; INTRAVENOUS at 15:38

## 2024-04-09 RX ADMIN — BUPIVACAINE 10 ML: 13.3 INJECTION, SUSPENSION, LIPOSOMAL INFILTRATION at 14:34

## 2024-04-09 RX ADMIN — ONDANSETRON 4 MG: 2 INJECTION INTRAMUSCULAR; INTRAVENOUS at 16:01

## 2024-04-09 RX ADMIN — ACETAMINOPHEN 1000 MG: 500 TABLET, FILM COATED ORAL at 14:18

## 2024-04-09 RX ADMIN — PROPOFOL 200 MG: 10 INJECTION, EMULSION INTRAVENOUS at 14:46

## 2024-04-09 RX ADMIN — DEXAMETHASONE SODIUM PHOSPHATE 8 MG: 4 INJECTION INTRA-ARTICULAR; INTRALESIONAL; INTRAMUSCULAR; INTRAVENOUS; SOFT TISSUE at 14:47

## 2024-04-09 RX ADMIN — CELECOXIB 200 MG: 200 CAPSULE ORAL at 14:18

## 2024-04-09 RX ADMIN — OXYCODONE HYDROCHLORIDE 5 MG: 5 SOLUTION ORAL at 16:40

## 2024-04-09 RX ADMIN — MIDAZOLAM HYDROCHLORIDE 2 MG: 1 INJECTION, SOLUTION INTRAMUSCULAR; INTRAVENOUS at 14:32

## 2024-04-09 RX ADMIN — FENTANYL CITRATE 50 MCG: 50 INJECTION, SOLUTION INTRAMUSCULAR; INTRAVENOUS at 15:50

## 2024-04-09 RX ADMIN — EPHEDRINE SULFATE 10 MG: 50 INJECTION, SOLUTION INTRAVENOUS at 15:21

## 2024-04-09 RX ADMIN — TRANEXAMIC ACID 1000 MG: 100 INJECTION, SOLUTION INTRAVENOUS at 14:47

## 2024-04-09 RX ADMIN — CEFAZOLIN 2 G: 1 INJECTION, POWDER, FOR SOLUTION INTRAMUSCULAR; INTRAVENOUS at 14:47

## 2024-04-09 RX ADMIN — BUPIVACAINE HYDROCHLORIDE 10 ML: 5 INJECTION, SOLUTION EPIDURAL; INTRACAUDAL at 14:34

## 2024-04-09 RX ADMIN — ROCURONIUM BROMIDE 50 MG: 50 INJECTION, SOLUTION INTRAVENOUS at 14:46

## 2024-04-09 RX ADMIN — SUGAMMADEX 200 MG: 100 INJECTION, SOLUTION INTRAVENOUS at 16:00

## 2024-04-09 RX ADMIN — SODIUM CHLORIDE, POTASSIUM CHLORIDE, SODIUM LACTATE AND CALCIUM CHLORIDE: 600; 310; 30; 20 INJECTION, SOLUTION INTRAVENOUS at 14:43

## 2024-04-09 RX ADMIN — VANCOMYCIN HYDROCHLORIDE 1 G: 1 INJECTION, POWDER, LYOPHILIZED, FOR SOLUTION INTRAVENOUS at 14:47

## 2024-04-09 RX ADMIN — TRANEXAMIC ACID 1000 MG: 100 INJECTION, SOLUTION INTRAVENOUS at 16:00

## 2024-04-09 ASSESSMENT — COGNITIVE AND FUNCTIONAL STATUS - GENERAL
HELP NEEDED FOR BATHING: A LOT
DRESSING REGULAR LOWER BODY CLOTHING: A LITTLE
EATING MEALS: A LITTLE
PERSONAL GROOMING: A LITTLE
SUGGESTED CMS G CODE MODIFIER DAILY ACTIVITY: CK
DRESSING REGULAR UPPER BODY CLOTHING: A LOT
TOILETING: A LITTLE
DAILY ACTIVITIY SCORE: 16

## 2024-04-09 ASSESSMENT — FIBROSIS 4 INDEX: FIB4 SCORE: 0.73

## 2024-04-09 ASSESSMENT — GAIT ASSESSMENTS: DISTANCE (FEET): 3

## 2024-04-09 ASSESSMENT — ACTIVITIES OF DAILY LIVING (ADL): TOILETING: INDEPENDENT

## 2024-04-09 ASSESSMENT — PAIN DESCRIPTION - PAIN TYPE: TYPE: CHRONIC PAIN

## 2024-04-09 ASSESSMENT — PAIN SCALES - GENERAL: PAIN_LEVEL: 3

## 2024-04-09 NOTE — OR NURSING
1308 Procedure, patient allergies and NPO status verified. Home med rec completed and belongings secured. Patient verbalizes understanding of pain scale, expected course of stay and plan of care. IV access established. SCD and jocy hose placed on bilateral calves. Triple aim completed by Theodora PORTILLO.    1418 Preop complete.

## 2024-04-09 NOTE — ANESTHESIA PROCEDURE NOTES
Airway    Date/Time: 4/9/2024 2:46 PM    Performed by: Gato Pantoja M.D.  Authorized by: Gato Pantoja M.D.    Location:  OR  Urgency:  Elective  Indications for Airway Management:  Anesthesia      Spontaneous Ventilation: absent    Sedation Level:  Deep  Preoxygenated: Yes    Final Airway Type:  Supraglottic airway  Final Supraglottic Airway:  Standard LMA    SGA Size:  4  Number of Attempts at Approach:  1

## 2024-04-09 NOTE — ANESTHESIA PROCEDURE NOTES
Peripheral Block    Date/Time: 4/9/2024 2:32 PM    Performed by: Gato Pantoja M.D.  Authorized by: Gato Pantoja M.D.    Patient Location:  Pre-op  Start Time:  4/9/2024 2:32 PM  End Time:  4/9/2024 2:36 PM  Reason for Block: at surgeon's request and post-op pain management ONLY    patient identified, IV checked, site marked, risks and benefits discussed, surgical consent, monitors and equipment checked, pre-op evaluation and timeout performed    Patient Position:  Supine  Prep: ChloraPrep    Monitoring:  Heart rate, continuous pulse ox and cardiac monitor  Block Region:  Upper Extremity  Upper Extremity - Block Type:  BRACHIAL PLEXUS block, Interscalene approach    Laterality:  Left  Procedures: ultrasound guided  Image captured, interpreted and electronically stored.  Local Infiltration:  Lidocaine  Strength:  1 %  Dose:  3 ml  Block Type:  Single-shot  Needle Length:  100mm  Needle Gauge:  21 G  Needle Localization:  Ultrasound guidance  Ultrasound picture in chart  Injection Assessment:  Negative aspiration for heme, no paresthesia on injection, incremental injection and local visualized surrounding nerve on ultrasound  Evidence of intravascular injection: No     US Guided Interscalene Brachial Plexus Block   US transducer placed on the neck in oblique plane approximately at the level of C6.  Anterior and Middle Scalene (MSM) muscles identified with nerve trunks identified between the muscles.  Needle inserted lateral to probe and advanced under direct visualization through the MSM into a perineural position.  After negative aspiration, LA injected with ease and visualized surrounding the nerve trunks.

## 2024-04-09 NOTE — OR NURSING
1624: Patient arrived to PACU from OR via gurney. Report received from anesthesia and RN. Respirations are spontaneous and unlabored. VSS on 6L. Dressing is CDI. Cold pack applied. LUE elevated. LUE: radial pulse 2+, cap refill less than 3 seconds, warm, pink. OPA in place.    1637: OPA removed    1640: c/o 4/10 left shoulder pain. See MAR, PO analgesia given    1650: family updated    1710: pt meets criteria for transfer to stage II. Report called to receiving RN

## 2024-04-09 NOTE — OR SURGEON
Immediate Post OP Note    PreOp Diagnosis: LEFT shoulder OA/RC arthropathy      PostOp Diagnosis: SAME      Procedure(s):  LEFT REVERSE TOTAL SHOULDER ARTHROPLASTY - Wound Class: Clean    Surgeon(s):  Kirstie Conn M.D.    Anesthesiologist/Type of Anesthesia:  Anesthesiologist: Gato Pantoja M.D./General    Surgical Staff:  Circulator: Stacey Linares R.N.  Limb Sequeira: Jeff Brantley  Scrub Person: Everette Cat Assist: Eamon Stearns    Specimens removed if any:  * No specimens in log *    Estimated Blood Loss: 100 cc    Findings: as above    Complications: none    [place]        4/9/2024 4:38 PM Kirstie Conn M.D.  
154.94

## 2024-04-09 NOTE — DISCHARGE INSTRUCTIONS
What to Expect Post Anesthesia    Rest and take it easy for the first 24 hours.  A responsible adult is recommended to remain with you during that time.  It is normal to feel sleepy.  We encourage you to not do anything that requires balance, judgment or coordination.    FOR 24 HOURS DO NOT:  Drive, operate machinery or run household appliances.  Drink beer or alcoholic beverages.  Make important decisions or sign legal documents.    To avoid nausea, slowly advance diet as tolerated, avoiding spicy or greasy foods for the first day.  Add more substantial food to your diet according to your provider's instructions.  Babies can be fed formula or breast milk as soon as they are hungry.  INCREASE FLUIDS AND FIBER TO AVOID CONSTIPATION.    MILD FLU-LIKE SYMPTOMS ARE NORMAL.  YOU MAY EXPERIENCE GENERALIZED MUSCLE ACHES, THROAT IRRITATION, HEADACHE AND/OR SOME NAUSEA.    If any questions arise, call your provider.  If your provider is not available, please feel free to call the Surgical Center at [phone].    MEDICATIONS: Resume taking daily medication.  Take prescribed pain medication with food.  If no medication is prescribed, you may take non-aspirin pain medication if needed.  PAIN MEDICATION CAN BE VERY CONSTIPATING.  Take a stool softener or laxative such as senokot, pericolace, or milk of magnesia if needed.    Last pain medication given at 04:40 PM, 5 mg Oxycodone.     Diet  Resume pre-operative diet upon discharge from the hospital. Depending on how you are feeling and whether you have nausea or not, you may wish to stay with a bland diet for the first few days. However, you can advance your diet as quickly as you feel ready.      Shoulder Total Joint Replacement   Post-Operative Instructions       Nerve Block:  The effects of the nerve block may last from 12-60 hours depending on the medication used.   It is recommended to sleep in a semi upright position for the first few days as the nerve block may cause  shortness of breath when lying flat.        Dressings: Keep your dressing clean after surgery.  A waterproof adherent dressing will be placed over your incision at the conclusion of your surgery.  Change your dressing one week following surgery with the extra dressing provided.  Please inspect your incision for any surrounding redness or drainage when you change the dressing.  If necessary, you may clean the edges of the wound with soap & water.  Dry the area with a clean cloth.  Then, place a new dressing over your incision.  If the dressing becomes loose or saturated with blood or fluid, it may be replaced at any time. Please keep your incision covered until your first post-operative appointment. At your first post-op appointment, your dressing will be removed.   In most cases, the surgical incision will not need to be covered after two weeks.     Baths/Pools/Hot Tubs:  Please do not submerge your incision in a hot tub, pool, or bath until at least six weeks after surgery.  Make sure that your incision is healed with no remaining scab or drainage before submerging in water.     Compression Hose/HARSHAL hose: Compression hose/HARSHAL hose will be placed on your legs prior to surgery at the hospital.  Please keep these on for at least two days, as they help control leg swelling as well as reduce the risk of a blood clot.   You may remove the compression hose temporarily to shower.     Ice: Use ice or cold therapy to your shoulder as you feel necessary to help with the pain and swelling.  Typically, this is 20 minutes on and 20 minutes off for the first several days following surgery.  Cold therapy units can be used as directed.        Sling:  Please wear your sling when walking about and when sleeping.  You may carefully remove the sling when awake and seated.  Please support your forearm on a pillow when the sling is removed. You may remove the pillow portion of the sling after two weeks. If you have questions regarding  how to wear sling or need a replacement, please contact [facility] @ [phone].     Activity:  You may remove your sling when awake and seated.  Please support your forearm on a pillow.  Once your sling is removed, you may move your elbow, wrist and hand as tolerated. Please do not lift anything heavier than a cup of coffee on your operative side.  Codman/pendulum exercises are encouraged 3-5 times a day. You will need to remove the sling to perform these exercises.     Pain Medication:  Take your pain medication as prescribed, only as needed.  Do not drive or operate machinery while taking narcotic pain medication.  You may resume anti-inflammatory medications any time after surgery. Please take medication with food to avoid stomach upset.     Aspirin:  Please take Aspirin 81 mg twice daily starting the morning after surgery and continue for 2 weeks, to help prevent a blood clot.     Driving:  Please arrange a ride to and from the hospital/surgery center on the day of surgery.   You may drive as soon as you are off pain medication and feel comfortable behind the wheel.     Physical Therapy:  Contact a physical therapy facility approved by your insurance plan to schedule your initial visits.  Typically, physical therapy is scheduled twice weekly to begin at approximately two weeks after surgery for primary total shoulder patients and six weeks after surgery for reverse total shoulder patients.     Follow-up:  Your first post-op appointment should be scheduled 10-14 days after surgery.  The details of your procedure and specifics of rehabilitation will be discussed.     Problems/concerns: If you have a problem or significant concern (unexpected pain, excessive bleeding or discharge from your incisions, fever or chills) or do not hesitate to call the office @ [phone] or go to your local emergency room.       Kirstie Conn MD     [facility]   [address], [address]   [place]  "[place] [id number]     Office telephone:  [phone]   Shoulder Peripheral Nerve Block Discharge Instructions    Shoulder Surgery Side Effects  In addition to numbness and weakness you may experience other symptoms  Other nerves that are close to those nerves injected can also be affected by local anesthesia  You may experience a hoarseness in your voice  Your breathing may feel different  You may also notice drooping of your eyelid, pupil constriction, and decreased sweating, on the side of your surgery  All of these side effects are normal and will resolve when the local anesthetic wears off     Prevent Injury  Protect the limb like a baby  Beware of exposing your limb to extreme heat or cold or trauma  The limb may be injured without you noticing because it is numb  Keep the limb elevated whenever possible  Do not sleep on the limb  Change the position of the limb regularly  Avoid putting pressure on your surgical limb    Pain Control  The initial block on the day of surgery will make your extremity feel \"numb\"  Any consecutive injection including prior to discharge from the hospital will make your extremity feel \"numb\"  You may feel an aching or burning when the local anesthesia starts to wear off  Take pain pills as prescribed by your surgeon  Call your surgeon or anesthesiologist if you do not have adequate pain control  "

## 2024-04-09 NOTE — ANESTHESIA PREPROCEDURE EVALUATION
Case: [id number] Date/Time: 04/09/24 1445    Procedure: LEFT REVERSE TOTAL SHOULDER ARTHROPLASTY    Pre-op diagnosis: Z98.890    Location:  OR  / SURGERY [facility]    Surgeons: Kirstie Conn M.D.            Relevant Problems   ANESTHESIA   (positive) Sleep apnea      PULMONARY (within normal limits)      NEURO (within normal limits)      CARDIAC (within normal limits)      GI (within normal limits)       (within normal limits)      ENDO (within normal limits)      OB (within normal limits)      Other   (positive) Awareness under anesthesia   Pt reports unable to edyta cpap and not using    Physical Exam    Airway   Mallampati: II  TM distance: >3 FB  Neck ROM: full       Cardiovascular - normal exam  Rhythm: regular  Rate: normal  (-) murmur     Dental - normal exam           Pulmonary - normal exam  Breath sounds clear to auscultation     Abdominal    Neurological - normal exam                   Anesthesia Plan    ASA 2       Plan - general and peripheral nerve block     Peripheral nerve block will be post-op pain control  Airway plan will be LMA          Induction: intravenous    Postoperative Plan: Postoperative administration of opioids is intended.    Pertinent diagnostic labs and testing reviewed    Informed Consent:    Anesthetic plan and risks discussed with patient.    Use of blood products discussed with: patient whom consented to blood products.

## 2024-04-10 NOTE — OP REPORT
DATE OF SERVICE:  04/09/2024     PREOPERATIVE DIAGNOSIS:  Severe glenohumeral joint arthritis with rotator cuff   arthropathy, left shoulder.     POSTOPERATIVE DIAGNOSIS:  Severe glenohumeral joint arthritis with rotator   cuff arthropathy, left shoulder.     PROCEDURE:  Left reverse total shoulder arthroplasty.     SURGEON:  Kirstie Conn MD     ASSISTANT:  SARA Escobar     ANESTHESIOLOGIST:  Gato Pantoja MD     TYPE OF ANESTHESIA:  General, with preoperative interscalene nerve block.     INTRAVENOUS FLUID:  One liter crystalloid.     ESTIMATED BLOOD LOSS:  100 mL     DRAINS:  None.     SPECIMENS:  None.     COMPLICATIONS:  None.     IMPLANTS:  Wilber size 29 mm full wedge baseplate, central screw, 3   peripheral screws, standard 42 mm glenosphere, size 3 standard Perform humeral   stem, 42+3 retentive polyethylene insert.     REASON FOR PROCEDURE:  The patient is a 54-year-old male with a longstanding   history of left shoulder problems, related to a work injury.  He has undergone   two prior left shoulder arthroscopic procedures with failure of his rotator   cuff to heal.  He developed progressive pain as well as weakness.  We reviewed   plain x-ray, MRI, as well as CT scan findings and decided to proceed with   reverse total shoulder arthroplasty.     OPERATION:  The patient was given a left interscalene nerve block using   Exparel by the anesthesiologist before surgery.  Once back in the operating   room, a breathing tube was placed.  He was given 2 grams of IV Ancef as well   as 1 gram of tranexamic acid and 1 gram of vancomycin.  He was placed in the   beach-chair position after induction of a general anesthetic.  The left upper   extremity was prepped with ChloraPrep and draped in standard sterile fashion.    It was then placed in the spider articulating arm alba.  A deltopectoral   incision was made using the PlasmaBlade.  The cephalic vein was identified and   retracted laterally.  The  subscapularis tendon was noted to be intact.  There   was no visible or palpable biceps, which had likely been previously tenodesed   or simply released.  Using a peel-off technique, the upper corner of the   subscapularis tendon was tagged, giving access to the arthritic glenohumeral   joint.  It appeared that the arthritic change had progressed due to deficiency   and essentially nonhealing of the rotator cuff superiorly.  There was a large   superior rotator cuff tear without any visible normal tissue involving the   supraspinatus or infraspinatus.  The far posterior rotator cuff was intact.    The intramedullary cutting guide was then placed and the cut was made.  There   were several metal and PEEK rotator cuff anchors that were encountered, which   were removed, 4 in total. The anchors were removed at the time of preparation   of the humeral stem. With a cut protector over the proximal humeral cut   surface, retractors were placed circumferentially around the glenoid.  The   patient's preoperative CT blueprint scan was referenced.  The pin was drilled   through the far cortex.  The angled reamer was used.  The Boss was drilled.    The bicortical drill was then used.  The central set screw was measured and   tapped at 45 mm of length.  A 29 mm full wedge baseplate coupled with a   central 45 mm screw was compressed down to the prepared bony surface.  Three   peripheral screws were then placed.  The peripheral reamer was used and a 42   mm glenosphere was locked in place.  Attention was then turned back to the   proximal humerus.  A size 3 Perform humeral stem was prepared and placed, with   the four PEEK metal rotator cuff anchors were removed.  Trial polyethylenes   were used.  The decision was made to place a size 3 Perform humeral stem.  The   lesser tuberosity was roughened, then two drill holes were placed through the   lesser tuberosity with a #2 XBraid suture placed through each one for   subscapularis  tendon repair.  A size 3 Perform humeral stem was impacted.  A   42+3 retentive polyethylene insert was impacted and the shoulder was reduced.    The subscapularis tendon was then closed with multiple modified Michael-Bharath   sutures, with oversewing of the subscapularis tendon repair.  The biceps   tendon was not encountered.  One gram of vancomycin powder was placed in the   deep and superficial soft tissues.  The deltopectoral interval was then closed   with 0 Vicryl.  A running 2-0 Monocryl was used followed by running 3-0   Monocryl in subcutaneous and subcuticular layers.  Benzoin and Steri-Strips   were placed followed by sterile Aquacel dressing.  All drapes were removed and   the arm was carefully taken out of the traction device and placed into a   shoulder abduction sling.  The patient was placed supine on a stretcher and   taken to recovery room, in stable condition.        ______________________________  MD KALI VILLARREAL/CARLEY    DD:  04/09/2024 16:45  DT:  04/09/2024 17:36    Job#:  [id number]

## 2024-04-10 NOTE — ANESTHESIA POSTPROCEDURE EVALUATION
Patient: Wil Schrader    Procedure Summary       Date: 04/09/24 Room / Location:  OR 05 / SURGERY [facility]    Anesthesia Start: 1443 Anesthesia Stop: 1625    Procedure: LEFT REVERSE TOTAL SHOULDER ARTHROPLASTY (Left: Shoulder) Diagnosis: (Z98.890)    Surgeons: Kirstie Conn M.D. Responsible Provider: Gato Pantoja M.D.    Anesthesia Type: general, peripheral nerve block ASA Status: 2            Final Anesthesia Type: general, peripheral nerve block  Last vitals  BP   Blood Pressure: 118/55    Temp   36.2 °C (97.2 °F)    Pulse   93   Resp   16    SpO2   91 %      Anesthesia Post Evaluation    Patient location during evaluation: PACU  Patient participation: complete - patient participated  Level of consciousness: awake and alert  Pain score: 3    Airway patency: patent  Anesthetic complications: no  Cardiovascular status: hemodynamically stable  Respiratory status: acceptable  Hydration status: euvolemic    PONV: none          No notable events documented.     Nurse Pain Score: 3 (NPRS)

## 2024-04-10 NOTE — THERAPY
Occupational Therapy   Initial Evaluation     Patient Name: Wil Schrader  Age:  54 y.o., Sex:  male  Medical Record #: [id number]  Today's Date: 4/9/2024     Precautions  Precautions: Non Weight Bearing Left Upper Extremity, Immobilizer Left Upper Extremity    Assessment  Patient is 54 y.o. male admit for L Reverse TSA.  Pt with h/o multiple shoulder surgeries on LUE leading up to this procedure.  He is very familiar with brace, use of polar ice (game Ready) machine.  Pt tolerated UB dressing and brace mgmt training, ADL transfers and functional mobility.  Pt demos good safety awareness with self care tasks, exercises, transfers and ADL's.  OT adjusted brace for best fit.  Pt will have assist avail from SO as needed at home.  Pt attentive to education as stated below.  Pt appears appropriate for discharge home.      Treatment completed this session after initial evaluation completed:  Pt educated in moderate detail on ADL's after Reverse Total Shoulder surgery. As stated, he is quite familiar with immobilizer, pendulums etc. Patient specifically educated on surgeon's post-operative precautions including NWB, no pushing pulling or lifting with surgery arm.    Summary of education completed:  How to adjust sling/immobilizer for best fit.  To keep sling positioned so hand is level or slightly above elbow to reduce pull of gravity on arm and maintain shoulder in neutral positioning.  How to don & doff sling/immobilizer safely and appropriately for dressing and bathing.  How to dress upper body while maintaining post surgical precautions.  How to shower safely.  How to appropriately cover incision for showering if needed prior to removal of post op dressing.    Proper positioning of arm during showering.   Encouraged use of hand held shower (using the non-operative extremity) to keep water away from the incision site, and to not spray water directly under the axilla of the operated side if the bandage is compromised at  "all.   Safe shower and toilet transfers.  Proper positioning while sleeping either in bed or recliner  Encouraged patient to support arm with pillows under forearm when sitting to reduce strain on the neck from the weight of the arm and immobilizer.   Proper bed mobility and transfer techniques while maintaining NWB on surgical arm.  Proper positioning of arm for gentle wrist/hand ROM and passive elbow extension and Pendulum Ex's for shoulder.   Pain management education including the use of ice and positioning for optimal comfort.   OT assisted pt to don Game ready ice pad so it was ready to plug in when he gets home.    Pt  verbalized and demonstrated understanding of education provided.     Plan    Occupational Therapy Initial Treatment Plan   Duration: Evaluation only    DC Equipment Recommendations: None  Discharge Recommendations: Anticipate that the patient will have no further occupational therapy needs after discharge from the hospital     Subjective    \"We can make this quick, I have had so many shoulder surgeries already it's unreal.\"     Objective       04/09/24 9182   Prior Living Situation   Prior Services Home-Independent   Housing / Facility 1 Story House   Bathroom Set up Walk In Shower   Equipment Owned None   Lives with - Patient's Self Care Capacity Significant Other   Comments SO avail to assist   Prior Level of ADL Function   Self Feeding Independent   Grooming / Hygiene Independent   Bathing Independent   Dressing Independent   Toileting Independent   Prior Level of IADL Function   Medication Management Independent   Laundry Independent   Kitchen Mobility Independent   Finances Independent   Home Management Independent   Shopping Independent   Prior Level Of Mobility Independent Without Device in Community   Driving / Transportation Driving Independent   Occupation (Pre-Hospital Vocational) Unable To Determine At This Time   Leisure Interests Unable To Determine At This Time   Precautions "   Precautions Non Weight Bearing Left Upper Extremity;Immobilizer Left Upper Extremity   Vitals   Pulse Oximetry 92 %   O2 Delivery Device None - Room Air   Pain 0 - 10 Group   Location Shoulder   Location Orientation Left   Description Aching   Therapist Pain Assessment During Activity;Nurse Notified  (nerve block in effect)   Cognition    Cognition / Consciousness WDL   Active ROM Upper Body   Active ROM Upper Body  X   Comments RUE WFL, LUE limited by nerve block in effect, immobilizer in place and surgical precautions   Strength Upper Body   Comments RUE WFL, LUE limited by nerve block in effect, immobilizer in place and surgical precautions   Sensation Upper Body   Comments RUE WFL, LUE limited by nerve block in effect, immobilizer in place and surgical precautions   Coordination Upper Body   Comments RUE WFL, LUE limited by nerve block in effect, immobilizer in place and surgical precautions   Balance Assessment   Sitting Balance (Static) Good   Sitting Balance (Dynamic) Good   Standing Balance (Static) Good   Standing Balance (Dynamic) Good   Weight Shift Sitting Good   Weight Shift Standing Good   Comments no device   Bed Mobility    Comments UIC t/o in stage 2   ADL Assessment   Upper Body Dressing Moderate Assist  (shirt on over top of brace, pt didn't want it placed under brace at this time)   Lower Body Dressing Contact Guard Assist   How much help from another person does the patient currently need...   Putting on and taking off regular lower body clothing? 3   Bathing (including washing, rinsing, and drying)? 2   Toileting, which includes using a toilet, bedpan, or urinal? 3   Putting on and taking off regular upper body clothing? 2   Taking care of personal grooming such as brushing teeth? 3   Eating meals? 3   6 Clicks Daily Activity Score 16   Functional Mobility   Sit to Stand Standby Assist   Bed, Chair, Wheelchair Transfer Standby Assist   Transfer Method Stand Step   Mobility steps chairside    Distance (Feet) 3   # of Times Distance was Traveled 2   Visual Perception   Visual Perception  WDL   Edema / Skin Assessment   Comments dressing not observed   Activity Tolerance   Sitting in Chair > 40 min   Standing 5 min   Patient / Family Goals   Patient / Family Goal #1 home asap   Education Group   Education Provided Upper Extremity Range of Motion;Brace Wear and Care;Role of Occupational Therapist;Activities of Daily Living   Role of Occupational Therapist Patient Response Patient;Acceptance;Explanation;Verbal Demonstration   Upper Ext ROM Patient Response Patient;Acceptance;Explanation;Demonstration;Handout;Verbal Demonstration   Brace Wear & Care Patient Response Patient;Acceptance;Explanation;Demonstration;Handout;Verbal Demonstration   ADL Patient Response Patient;Acceptance;Explanation;Demonstration;Handout;Verbal Demonstration   Additional Comments see notes

## 2024-04-10 NOTE — OR NURSING
1714 patient to stage 2  Patient settled in recliner chair post short ambulation from [facility] - pt dressed with assist by CNA.  Dressing CDI to L shoulder with ice pack placed and +2 L radial pulse noted. Pt rates pain as tolerable and denies nausea. Discharge pending OT and family arrival.

## 2024-04-10 NOTE — OR NURSING
1815: Care assumed of awake/alert pt who verbalizes eagerness for d/c home. Awaiting OT.  1847: D/Shamar to care of family post seeing OT

## 2024-04-10 NOTE — ANESTHESIA TIME REPORT
Anesthesia Start and Stop Event Times       Date Time Event    4/9/2024 1440 Ready for Procedure     1443 Anesthesia Start     1625 Anesthesia Stop          Responsible Staff  04/09/24      Name Role Begin Caitlyn Pantoja M.D. Anesth 1443 1621          Overtime Reason:  no overtime (within assigned shift)    Comments:

## 2024-04-25 ENCOUNTER — OFFICE VISIT (OUTPATIENT)
Dept: MEDICAL GROUP | Facility: MEDICAL CENTER | Age: 54
End: 2024-04-25
Payer: MEDICAID

## 2024-04-25 VITALS
WEIGHT: 217.8 LBS | BODY MASS INDEX: 35 KG/M2 | OXYGEN SATURATION: 95 % | HEART RATE: 74 BPM | TEMPERATURE: 97.4 F | HEIGHT: 66 IN | RESPIRATION RATE: 16 BRPM | SYSTOLIC BLOOD PRESSURE: 110 MMHG | DIASTOLIC BLOOD PRESSURE: 80 MMHG

## 2024-04-25 DIAGNOSIS — Z00.00 ANNUAL PHYSICAL EXAM: ICD-10-CM

## 2024-04-25 PROCEDURE — 99213 OFFICE O/P EST LOW 20 MIN: CPT

## 2024-04-25 RX ORDER — OXYCODONE HYDROCHLORIDE AND ACETAMINOPHEN 5; 325 MG/1; MG/1
TABLET ORAL
COMMUNITY
Start: 2024-03-07

## 2024-04-25 ASSESSMENT — FIBROSIS 4 INDEX: FIB4 SCORE: 0.73

## 2024-04-25 NOTE — PROGRESS NOTES
Subjective:     CC:   Chief Complaint   Patient presents with    Annual Exam       HPI:   Wil Schrader is a 54 y.o. male who presents for an annual exam. He is feeling well and has no complaints. Patient PSA was recently noted to be elevated. He has an appointment for a MR- Rectal/prostate protocol on 5/13/2024. He is seeing Dr Jin with [facility].     Health Maintenance    Cholesterol Screening:    Latest Reference Range & Units 03/07/24 09:07   Cholesterol,Tot 100 - 199 mg/dL 244 (H)   Triglycerides 0 - 149 mg/dL 231 (H)   HDL >=40 mg/dL 33 !   LDL <100 mg/dL 165 (H)     Diabetes Screening:    Latest Reference Range & Units 03/07/24 09:07   Glycohemoglobin 4.0 - 5.6 % 5.6     Aspirin Use: NA      Anticipatory Guidance  Diet: Discussed. Patient has reduced his sugar and carbohydrate intake.    Exercise: limited due to recent shoulder replacement   Substance Abuse: Marijuana use.   Safe in relationship.   Seat belts, bike helmet, gun safety discussed.  Sun protection used.  Dental home.    Cancer screening  Colorectal Cancer Screening: Referral sent on 3/7/2024    Lung Cancer Screening: up to date. CT guided left upper lobe lung biopsy. Core biopsy of lung demonstrating a nodule of fibrosis and chronic inflammation. No evidence of malignancy.   Prostate Cancer Screening/PSA: Patient has a prostate MRI scheduled on  5/13/2024   Latest Reference Range & Units 03/29/24 15:28   Prostatic Specific Antigen Tot 0.00 - 4.00 ng/mL 13.00 (H)       Infectious disease screening/Immunizations  --STI Screening: declined  --Practices safe sex.  --HIV Screening: completed  --Hepatitis C Screening: completed   --Immunizations:    Influenza: declined   Tetnus: utd     He  has a past medical history of Awareness under anesthesia (1996), BMI 36.0-36.9,adult (07/02/2015), High cholesterol, Hyperlipidemia, Left shoulder pain (04/08/2024), Right foot pain (07/02/2015), Sleep apnea (4/9/2024), Snoring, and Spinal  headache.    He has no past medical history of Acute nasopharyngitis, Anesthesia, Anginal syndrome (HCC), Arrhythmia, Arthritis, Asthma, Blood clotting disorder (HCC), Bowel habit changes, Breath shortness, Bronchitis, Cancer (HCC), Carcinoma in situ of respiratory system, Cataract, Congestive heart failure (HCC), Continuous ambulatory peritoneal dialysis status (HCC), Coughing blood, Dental disorder, Diabetes (HCC), Dialysis patient (HCC), Disorder of thyroid, Emphysema of lung (HCC), Glaucoma, Gynecological disorder, Heart burn, Heart murmur, Heart valve disease, Hemorrhagic disorder (HCC), Hepatitis A, Hepatitis B, Hepatitis C, Hiatus hernia syndrome, Hypertension, Indigestion, Infectious disease, Jaundice, Myocardial infarct (HCC), Pacemaker, Pneumonia, Pregnant, Psychiatric problem, Renal disorder, Rheumatic fever, Seizure (HCC), Stroke (HCC), Tuberculosis, Urinary bladder disorder, or Urinary incontinence.  He  has a past surgical history that includes tonsillectomy (as a child); vitrectomy anterior (Left, 10/13/2015); cataract phaco with iol (Left, 01/12/2016); rotator cuff repair (Bilateral, 1996); eyeball rupture repair (09/19/2008); eye surgery (10/03/2008); knee arthroscopy (Right, 2018); pr shldr arthroscop,surg,w/rotat cuff repr (Right, 11/24/2020); pr arthroscopy shoulder surgical biceps tenodes* (Right, 11/24/2020); pr shldr arthroscop,part acromioplas (Right, 11/24/2020); pr shldr arthroscop,surg,w/rotat cuff repr (Left, 09/07/2022); pr shldr arthroscop,part acromioplas (Left, 09/07/2022); pr unlisted proc, arthroscopy (Left, 09/07/2022); other (2000); colonoscopy; and pr reconstr total shoulder implant (Left, 4/9/2024).  Family History   Problem Relation Age of Onset    Hypertension Mother     Psychiatric Illness Mother     Colon Cancer Mother     Cancer Mother         Colon    Heart Disease Father     Thyroid Sister     Cancer Maternal Grandmother         Breast    Breast Cancer Maternal  Grandmother     Cancer Maternal Grandfather         Lung from smoking    Lung Cancer Maternal Grandfather      Social History     Tobacco Use    Smoking status: Never    Smokeless tobacco: Former     Types: Chew     Quit date: 1990   Vaping Use    Vaping Use: Never used   Substance Use Topics    Alcohol use: Not Currently     Comment: rare, twice/month    Drug use: Yes     Types: Inhaled, Marijuana     Comment: Occasional       Patient Active Problem List    Diagnosis Date Noted    Sleep apnea 04/09/2024    Lung nodule seen on imaging study 03/07/2024    Syncope 01/03/2024    Localized osteoarthrosis, shoulder region, left 10/12/2023    Full thickness rotator cuff tear 08/29/2023    Absence of lens 01/12/2016    Left corneal scar 10/13/2015    Routine health maintenance 07/02/2015    Right foot pain 07/02/2015    Insomnia 07/02/2015    Dyslipidemia 07/02/2015    BMI 36.0-36.9,adult 07/02/2015    Awareness under anesthesia 1996       Current Outpatient Medications   Medication Sig Dispense Refill    oxyCODONE-acetaminophen (PERCOCET) 5-325 MG Tab       Cholecalciferol (VITAMIN D-3 PO) Take  by mouth.      Cyanocobalamin (VITAMIN B-12 PO) Take  by mouth every 48 hours.       No current facility-administered medications for this visit.    (including changes today)  Allergies: Patient has no known allergies.    Review of Systems   Constitutional: Negative for fever, chills and malaise/fatigue.   HENT: Negative for congestion.    Eyes: Negative for pain.   Respiratory: Negative for cough and shortness of breath.    Cardiovascular: Negative for leg swelling.   Gastrointestinal: Negative for nausea, vomiting, abdominal pain and diarrhea.   Genitourinary: Negative for dysuria and hematuria.   Skin: Negative for rash.   Neurological: Negative for dizziness, focal weakness and headaches.   Endo/Heme/Allergies: Does not bleed easily.   Psychiatric/Behavioral: Negative for depression.  The patient is not nervous/anxious.   "    Objective:     /80 (BP Location: Right arm, Patient Position: Sitting, BP Cuff Size: Adult)   Pulse 74   Temp 36.3 °C (97.4 °F) (Temporal)   Resp 16   Ht 1.676 m (5' 6\")   Wt 98.8 kg (217 lb 12.8 oz)   SpO2 95%   BMI 35.15 kg/m²   Body mass index is 35.15 kg/m².  Wt Readings from Last 4 Encounters:   04/25/24 98.8 kg (217 lb 12.8 oz)   04/09/24 97.6 kg (215 lb 2.7 oz)   03/29/24 98.4 kg (216 lb 14.4 oz)   03/25/24 99.7 kg (219 lb 14.5 oz)       Physical Exam:  Constitutional: Well-developed and well-nourished. Not diaphoretic. No distress.   Skin: Skin is warm and dry. No rash noted.  Head: Atraumatic without lesions.  Eyes: Conjunctivae and extraocular motions are normal. Pupils are equal, round, and reactive to light. No scleral icterus.   Ears:  External ears unremarkable. Tympanic membranes clear and intact.  Nose: Nares patent. Septum midline. Turbinates without erythema nor edema. No discharge.   Mouth/Throat: Dentition is intact. Tongue normal. Oropharynx is clear and moist. Posterior pharynx without erythema or exudates.  Neck: Supple, trachea midline. Normal range of motion. No thyromegaly present. No lymphadenopathy--cervical or supraclavicular.  Cardiovascular: Regular rate and rhythm, S1 and S2 without murmur, rubs, or gallops.    Lungs: Effort normal. Clear to auscultation throughout. No adventitious sounds. No CVA tenderness.  Abdomen: Soft, non tender, and without distention. Active bowel sounds in all four quadrants. No rebound, guarding, masses or HSM.  Extremities: No cyanosis, clubbing, erythema, nor edema. Distal pulses intact and symmetric.   Musculoskeletal: All major joints AROM full in all directions without pain.  Neurological: Alert and oriented x 3. DTRs 2+/3 and symmetric. No cranial nerve deficit. 5/5 myotomes. Sensation intact.  Psychiatric:  Behavior, mood, and affect are appropriate.      Assessment and Plan:     1. Annual physical exam        Preventative Medicine / " "HCM visit with no emergent concerns.  - Recommended yearly HCM visits  - Discussed importance of healthy diet and exercise (5x/week, 20-30 minutes of sustained cardiovascular training)  -Advised on maintaining routine vaccinations  -Will contact with abnormal lab results.  -Advised pt to wear helmets. seatbelt and sunscreen as discussed; continue safer sex practices  - Anticipatory guidance including:  Exercise:   - Try for at least 150 minutes of cardiovascular (strenuous) exercise per week.   Safety:   - Wear your seat belt at all times when in a car and NO TEXTING/CELL PHONES while driving.   - Wear a helmet while biking, using a motorcycle, skiing/snow boarding, skate/long boarding, or any activities faster than running.   - Avoid smoking.   - If you have a gun it needs to be locked up and stored unloaded away from children.   Nutrition:   - Drink enough water so that urine is light yellow/clear  - Try to avoid alcoholic drinks; or consume no more than 2 alcoholic drinks per day for men. No more than 1 alcoholic drink per day for woman.   - Read labels for calories   - Use website \"My Fitness Pal\" for free calorie counting tool when possible.   Stress:   - Make time for activities that allow you to decrease stress and recognize any red flags of stress for you to know when to activate your stress release mechanisms.   Sleep:   - Try to get 7-9 hours of sleep each night.   Preventative Care:   - Follow-up yearly for your annual exams   - Colon cancer screening starting at age 45 until age 75  - Mammograms for breast cancer screening every 1-2 years can start at age 40; general recommendation is biennial screening mammography for women aged 50 to 74 years  - Annual flu vaccination   - Bone density screening at age 65  - Pneumonia vaccination at age 65   - Shingles vaccination at age 50  - VAGINAL PATHOGENS DNA PANEL    Follow-up: Return in about 1 year (around 4/25/2025).    "

## 2024-05-13 ENCOUNTER — HOSPITAL ENCOUNTER (OUTPATIENT)
Dept: RADIOLOGY | Facility: MEDICAL CENTER | Age: 54
End: 2024-05-13
Attending: UROLOGY
Payer: MEDICAID

## 2024-05-13 DIAGNOSIS — Z12.5 PROSTATE CANCER SCREENING: ICD-10-CM

## 2024-05-13 RX ORDER — DIAZEPAM 5 MG/1
10 TABLET ORAL
Status: COMPLETED | OUTPATIENT
Start: 2024-05-13 | End: 2024-05-13

## 2024-05-13 RX ADMIN — GADOTERIDOL 20 ML: 279.3 INJECTION, SOLUTION INTRAVENOUS at 10:10

## 2024-05-13 RX ADMIN — DIAZEPAM 10 MG: 5 TABLET ORAL at 08:41

## 2024-05-13 RX ADMIN — GLUCAGON 1 MG: 1 INJECTION, POWDER, LYOPHILIZED, FOR SOLUTION INTRAMUSCULAR; INTRAVENOUS at 09:16

## 2024-05-13 NOTE — DISCHARGE INSTRUCTIONS
MRI ADULT DISCHARGE INSTRUCTIONS    You have been medicated today for your scan. Please follow the instructions below to ensure your safe recovery. If you have any questions or problems, feel free to call us at [phone] or [phone].     1.   Have someone stay with you to assist you as needed.    2.   Do not drive or operate any mechanical devices.    3.   Do not perform any activity that requires concentration. Make no major decisions over the next 24 hours.     4.   Be careful changing positions from laying down to sitting or standing, as you may become dizzy.     5.   Do not drink alcohol for 48 hours.    6.   There are no restrictions for eating your normal meals. Drink fluids.    7.   You may continue your usual medications for pain, tranquilizers, muscle relaxants or sedatives when awake.     8.   Tomorrow, you may continue your normal daily activities.     9.   Pressure dressing on 10 - 15 minutes. If swelling or bleeding occurs when removed, continue placing direct pressure on injection site for another 5 minutes, or until bleeding stops.       Diazepam (VALIUM) Oral solution  What is this medicine?  You were prescribed DIAZEPAM (dye AZ e vinay) for the procedure you had today. This medication is a benzodiazepine. It is used to treat anxiety and nervousness. It also can help treat alcohol withdrawal, relax muscles, and treat certain types of seizures.  This medicine may be used for other purposes; ask your health care provider or pharmacist if you have questions.  What side effects may I notice from receiving this medicine?  Side effects that you should report to your doctor or health care professional as soon as possible:  allergic reactions like skin rash, itching or hives, swelling of the face, lips, or tongue  angry, confused, depressed, other mood changes  breathing problems  feeling faint or lightheaded, falls  muscle cramps  problems with balance, talking, walking  restlessness  tremors  trouble passing  urine or change in the amount of urine  unusually weak or tired  Side effects that usually do not require medical attention (report to your doctor or health care professional if they continue or are bothersome):  difficulty sleeping, nightmares  dizziness, drowsiness, clumsiness, or unsteadiness, a hangover effect  headache  nausea, vomiting  This list may not describe all possible side effects. Call your doctor for medical advice about side effects. You may report side effects to FDA at 4-016-ALE-0727.    I have been informed of and understand the above discharge instructions.

## 2024-05-15 ENCOUNTER — TELEPHONE (OUTPATIENT)
Dept: HEMATOLOGY ONCOLOGY | Facility: MEDICAL CENTER | Age: 54
End: 2024-05-15
Payer: MEDICAID

## 2024-05-15 NOTE — TELEPHONE ENCOUNTER
Reached out to [facility] to see if this patient has been scheduled with the office after being referred by Debbie James. Schedulers stated that they have reached out to the patient and he has not returned call to schedule. I have reached out as well via my chart to inform Wil about this attempt and still has not responded to [facility] or the Pulmonology facility.  stated she will make a call out to the patient again to try and schedule.

## 2024-05-20 ENCOUNTER — TELEPHONE (OUTPATIENT)
Dept: UROLOGY | Facility: MEDICAL CENTER | Age: 54
End: 2024-05-20
Payer: MEDICAID

## 2024-05-20 DIAGNOSIS — R97.20 ELEVATED PSA MEASUREMENT: ICD-10-CM

## 2024-05-20 NOTE — TELEPHONE ENCOUNTER
I called Mr. Schrader to discuss his prostate MRI, which revealed hypertrophy (volume 55 cc) but no suspicious lesions.     His PSA was elevated to 13 when first assessed around the time of a PET/CT that demonstrated increased uptake in the prostate. With a normal MRI, this may indicate an infectious/inflammatory pathology, and so prior to considering a biopsy I recommended a repeat PSA now that he's had a couple months elapse. If the PSA decreased significantly we can monitor, but if now we'll arrange for a biopsy.

## 2024-05-23 ENCOUNTER — HOSPITAL ENCOUNTER (OUTPATIENT)
Dept: LAB | Facility: MEDICAL CENTER | Age: 54
End: 2024-05-23
Attending: UROLOGY
Payer: MEDICAID

## 2024-05-23 DIAGNOSIS — R97.20 ELEVATED PSA MEASUREMENT: ICD-10-CM

## 2024-05-23 LAB — PSA SERPL-MCNC: 2.05 NG/ML (ref 0–4)

## 2024-05-25 DIAGNOSIS — R97.20 ELEVATED PSA MEASUREMENT: ICD-10-CM

## 2024-07-11 ENCOUNTER — OFFICE VISIT (OUTPATIENT)
Dept: UROLOGY | Facility: MEDICAL CENTER | Age: 54
End: 2024-07-11
Payer: MEDICAID

## 2024-07-11 VITALS
OXYGEN SATURATION: 96 % | WEIGHT: 211.6 LBS | SYSTOLIC BLOOD PRESSURE: 131 MMHG | HEART RATE: 88 BPM | BODY MASS INDEX: 34.15 KG/M2 | DIASTOLIC BLOOD PRESSURE: 93 MMHG | TEMPERATURE: 99.5 F

## 2024-07-11 DIAGNOSIS — R68.82 DECREASED LIBIDO: ICD-10-CM

## 2024-07-11 DIAGNOSIS — N52.9 ORGANIC ERECTILE DYSFUNCTION: ICD-10-CM

## 2024-07-11 DIAGNOSIS — Z12.5 PROSTATE CANCER SCREENING: ICD-10-CM

## 2024-07-11 PROCEDURE — 99213 OFFICE O/P EST LOW 20 MIN: CPT | Performed by: UROLOGY

## 2024-07-11 PROCEDURE — 3075F SYST BP GE 130 - 139MM HG: CPT | Performed by: UROLOGY

## 2024-07-11 PROCEDURE — 3080F DIAST BP >= 90 MM HG: CPT | Performed by: UROLOGY

## 2024-07-11 RX ORDER — TADALAFIL 20 MG/1
20 TABLET ORAL PRN
Qty: 30 TABLET | Refills: 3 | Status: SHIPPED | OUTPATIENT
Start: 2024-07-11

## 2024-07-11 ASSESSMENT — FIBROSIS 4 INDEX: FIB4 SCORE: 0.73

## 2024-07-12 ENCOUNTER — HOSPITAL ENCOUNTER (OUTPATIENT)
Dept: LAB | Facility: MEDICAL CENTER | Age: 54
End: 2024-07-12
Attending: UROLOGY
Payer: MEDICAID

## 2024-07-12 DIAGNOSIS — R68.82 DECREASED LIBIDO: ICD-10-CM

## 2024-07-12 DIAGNOSIS — N52.9 ORGANIC ERECTILE DYSFUNCTION: ICD-10-CM

## 2024-07-12 LAB — TESTOST SERPL-MCNC: 338 NG/DL (ref 175–781)

## 2024-07-12 PROCEDURE — 36415 COLL VENOUS BLD VENIPUNCTURE: CPT

## 2024-07-12 PROCEDURE — 84403 ASSAY OF TOTAL TESTOSTERONE: CPT

## 2024-09-17 ENCOUNTER — OFFICE VISIT (OUTPATIENT)
Dept: MEDICAL GROUP | Facility: MEDICAL CENTER | Age: 54
End: 2024-09-17
Payer: MEDICAID

## 2024-09-17 ENCOUNTER — HOSPITAL ENCOUNTER (OUTPATIENT)
Facility: MEDICAL CENTER | Age: 54
End: 2024-09-17
Payer: MEDICAID

## 2024-09-17 VITALS
OXYGEN SATURATION: 98 % | TEMPERATURE: 98.4 F | RESPIRATION RATE: 16 BRPM | SYSTOLIC BLOOD PRESSURE: 130 MMHG | BODY MASS INDEX: 34.93 KG/M2 | HEART RATE: 91 BPM | DIASTOLIC BLOOD PRESSURE: 80 MMHG | WEIGHT: 216.4 LBS

## 2024-09-17 DIAGNOSIS — R10.9 FLANK PAIN: ICD-10-CM

## 2024-09-17 DIAGNOSIS — R31.9 HEMATURIA, UNSPECIFIED TYPE: ICD-10-CM

## 2024-09-17 LAB
AMBIGUOUS DTTM AMBI4: NORMAL
APPEARANCE UR: CLEAR
BILIRUB UR STRIP-MCNC: NEGATIVE MG/DL
COLOR UR AUTO: YELLOW
GLUCOSE UR STRIP.AUTO-MCNC: NEGATIVE MG/DL
KETONES UR STRIP.AUTO-MCNC: NEGATIVE MG/DL
LEUKOCYTE ESTERASE UR QL STRIP.AUTO: NEGATIVE
NITRITE UR QL STRIP.AUTO: NEGATIVE
PH UR STRIP.AUTO: 7 [PH] (ref 5–8)
PROT UR QL STRIP: NORMAL MG/DL
RBC UR QL AUTO: NEGATIVE
SP GR UR STRIP.AUTO: 1.03
UROBILINOGEN UR STRIP-MCNC: 0.2 MG/DL

## 2024-09-17 PROCEDURE — 81002 URINALYSIS NONAUTO W/O SCOPE: CPT

## 2024-09-17 PROCEDURE — 3075F SYST BP GE 130 - 139MM HG: CPT

## 2024-09-17 PROCEDURE — 99213 OFFICE O/P EST LOW 20 MIN: CPT

## 2024-09-17 PROCEDURE — 3079F DIAST BP 80-89 MM HG: CPT

## 2024-09-17 PROCEDURE — 87086 URINE CULTURE/COLONY COUNT: CPT

## 2024-09-17 PROCEDURE — 99214 OFFICE O/P EST MOD 30 MIN: CPT

## 2024-09-17 ASSESSMENT — FIBROSIS 4 INDEX: FIB4 SCORE: 0.73

## 2024-09-17 NOTE — PROGRESS NOTES
Verbal consent was acquired by the patient to use [place] ambient listening note generation during this visit     Subjective:     CC:  Diagnoses of Hematuria, unspecified type and Flank pain were pertinent to this visit.    HISTORY OF THE PRESENT ILLNESS: Patient is a 54 y.o. male.     History of Present Illness  The patient presents for evaluation of hematuria.    He reports intermittent episodes of bright red blood in his urine, which he has not previously considered significant. He does not experience any difficulty urinating, burning sensation during urination, or urinary urgency or frequency. He maintains a high fluid intake, consuming approximately 2 gallons of water daily. His diet includes fish, chicken, and vegetables. experiences right-sided flank pain but has no history of kidney stones or fever. He has discontinued testosterone therapy.       Health Maintenance: reviewed and completed per patient preference.     ROS:   - CONSTITUTIONAL: Denies weight loss, fever and chills.  - HEENT: Denies changes in vision and hearing.  - RESPIRATORY: Denies SOB and cough.  - CV: Denies palpitations and CP.  - GI: Denies abdominal pain, nausea, vomiting and diarrhea.  - : Denies dysuria and urinary frequency. Reports intermittent blood in urine with right flank pain.   - MSK: Denies myalgia and joint pain.  - SKIN: Denies rash and pruritus.  - NEUROLOGICAL: Denies headache and syncope.  - PSYCHIATRIC: Denies recent changes in mood. Denies anxiety and depression.           Social History     Socioeconomic History    Marital status: Single     Spouse name: Not on file    Number of children: Not on file    Years of education: Not on file    Highest education level: Not on file   Occupational History    Not on file   Tobacco Use    Smoking status: Never    Smokeless tobacco: Former     Types: Chew     Quit date: 1990   Vaping Use    Vaping status: Never Used   Substance and Sexual Activity    Alcohol use: Not  Currently     Comment: rare, twice/month    Drug use: Yes     Types: Inhaled, Marijuana     Comment: Occasional    Sexual activity: Yes     Partners: Female   Other Topics Concern    Not on file   Social History Narrative     - on workers comp right now for his shoulder     Social Determinants of Health     Financial Resource Strain: Not on file   Food Insecurity: Not on file   Transportation Needs: Not on file   Physical Activity: Not on file   Stress: Not on file   Social Connections: Not on file   Intimate Partner Violence: Not on file   Housing Stability: Not on file      No Known Allergies         Current Outpatient Medications:     tadalafil, 20 mg, Oral, PRN    oxyCODONE-acetaminophen,     Cholecalciferol (VITAMIN D-3 PO), Take  by mouth.    Cyanocobalamin (VITAMIN B-12 PO), Take  by mouth every 48 hours.   Objective:     Exam: /80 (BP Location: Right arm, Patient Position: Sitting, BP Cuff Size: Adult)   Pulse 91   Temp 36.9 °C (98.4 °F) (Temporal)   Resp 16   Wt 98.2 kg (216 lb 6.4 oz)   SpO2 98%  Body mass index is 34.93 kg/m².    Physical Exam:  Constitutional: Alert, no distress, well-groomed.  Skin: Warm, dry, good turgor, no rashes in visible areas.  Eye: Equal, round and reactive, conjunctiva clear, lids normal.  ENMT: Lips without lesions, good dentition, moist mucous membranes.  Neck: Trachea midline, no masses, no thyromegaly.  Respiratory: Unlabored respiratory effort, no cough.  Abd: soft, non tender, non distended, normal BS  MSK: Normal gait, moves all extremities.  Neuro: Grossly non-focal.   Psych: Alert and oriented x3, normal affect and mood.    Labs: Reviewed     Latest Reference Range & Units 09/17/24 17:19   POC Color Negative  YELLOW   POC Appearance Negative  CLEAR   POC Specific Gravity <1.005 - >1.030  1.030   POC Urine PH 5.0 - 8.0  7.0   POC Glucose Negative mg/dL NEGATIVE   POC Ketones Negative mg/dL NEGATIVE   POC Protein Negative mg/dL TRACE   POC  Nitrites Negative  NEGATIVE   POC Leukocyte Esterase Negative  NEGATIVE   POC Blood Negative  NEGATIVE   POC Bilirubin Negative mg/dL NEGATIVE   POC Urobiligen Negative (0.2) mg/dL 0.2       Assessment & Plan:   54 y.o. male with the following -    1. Hematuria, unspecified type  Acute issue, currently not active, etiology unsure prognosis uncertain. The patient reports intermittent bright red blood in the urine without associated pain or difficulty urinating. Differential diagnoses include kidney stone, issues with the prostate, bladder, or a possible urinary tract infection (UTI). A urinalysis will be conducted today to check for a UTI. An ultrasound of the kidneys and bladder has been ordered to rule out kidney stones or other abnormalities. The patient is advised to schedule an appointment with urology for further evaluation, including a possible cystoscopy to examine the bladder. He is followed by urology for an incidental lesion in the prostate found on PET-CT, and an elevated PSA of 13. However, follow up mpMRI of the prostate was normal and his PSA decreased to 2, so the PET lesion and initial PSA were most likely infectious/inflammatory in nature. Given that his UA was with no abnormal findings, and his intermittent right flank pain, I am suspicious that he may have a kidney stone. I have recommended dietary modifications as well as ample hydration. ER precautions given.    - US-RENAL; Future  - US-BLADDER; Future  - POCT Urinalysis  - URINE CULTURE(NEW); Future    2. Flank pain  Acute issue, currently not active. The patient reports right-sided flank pain without fever or history of kidney stones. The pain is suspected to be related to the hematuria. An ultrasound of the kidneys and bladder has been ordered to investigate the cause of the pain, including the possibility of kidney stones.        Return if symptoms worsen or fail to improve.    Please note that this dictation was created using voice  recognition software. I have made every reasonable attempt to correct obvious errors, but I expect that there are errors of grammar and possibly content that I did not discover before finalizing the note.

## 2024-09-20 LAB
BACTERIA UR CULT: NORMAL
SIGNIFICANT IND 70042: NORMAL
SITE SITE: NORMAL
SOURCE SOURCE: NORMAL

## 2024-10-18 ENCOUNTER — HOSPITAL ENCOUNTER (OUTPATIENT)
Dept: RADIOLOGY | Facility: MEDICAL CENTER | Age: 54
End: 2024-10-18
Payer: COMMERCIAL

## 2024-10-18 DIAGNOSIS — R31.9 HEMATURIA, UNSPECIFIED TYPE: ICD-10-CM

## 2024-10-18 PROCEDURE — 76775 US EXAM ABDO BACK WALL LIM: CPT

## 2024-11-05 ENCOUNTER — OFFICE VISIT (OUTPATIENT)
Dept: UROLOGY | Facility: MEDICAL CENTER | Age: 54
End: 2024-11-05
Payer: MEDICAID

## 2024-11-05 DIAGNOSIS — R31.9 HEMATURIA, UNSPECIFIED TYPE: ICD-10-CM

## 2024-11-05 DIAGNOSIS — R31.0 GROSS HEMATURIA: ICD-10-CM

## 2024-11-05 LAB
APPEARANCE UR: CLEAR
BILIRUB UR STRIP-MCNC: NORMAL MG/DL
COLOR UR AUTO: YELLOW
GLUCOSE UR STRIP.AUTO-MCNC: NORMAL MG/DL
KETONES UR STRIP.AUTO-MCNC: NORMAL MG/DL
LEUKOCYTE ESTERASE UR QL STRIP.AUTO: NORMAL
NITRITE UR QL STRIP.AUTO: NORMAL
PH UR STRIP.AUTO: 6 [PH] (ref 5–8)
PROT UR QL STRIP: NORMAL MG/DL
RBC UR QL AUTO: NORMAL
SP GR UR STRIP.AUTO: 1.01
UROBILINOGEN UR STRIP-MCNC: 0.2 MG/DL

## 2024-11-05 PROCEDURE — 99214 OFFICE O/P EST MOD 30 MIN: CPT | Performed by: UROLOGY

## 2024-11-05 PROCEDURE — 81002 URINALYSIS NONAUTO W/O SCOPE: CPT | Performed by: UROLOGY

## 2024-11-05 RX ORDER — SILDENAFIL 100 MG/1
100 TABLET, FILM COATED ORAL
Qty: 30 TABLET | Refills: 3 | Status: SHIPPED | OUTPATIENT
Start: 2024-11-05

## 2024-11-05 NOTE — PROGRESS NOTES
Chief Complaint: ED, gross hematuria    HPI: Wil Schrader is a 54 y.o. male with a history of PSA elevation, though with follow up levels normalized form 13 to 2 and negative mpMRI of the prostate, here for follow up of ED. Meanwhile, he also has had numerous episodes of gross hematuria, noting small worm-like clots of blood in the toilet after voiding. He did have right flank pain, and so underwent a renal ultrasound with his PCP, but this did not reveal any stones, hydronephrosis, or renal masses. He has not had dysuria or any new lower urinary tract symptoms, and urinalysis today is normal.     He has not had any benefit from use of 20 mg tadalafil. Interested in alternative options.     Past Medical History:  Past Medical History:   Diagnosis Date    Awareness under anesthesia 1996    woke up during shoulder surgery    BMI 36.0-36.9,adult 07/02/2015    High cholesterol     Hyperlipidemia     Left shoulder pain 04/08/2024    Right foot pain 07/02/2015    Sleep apnea 4/9/2024    Quit CPAP due to unable to tolerate.    Snoring     Spinal headache        Past Surgical History:  Past Surgical History:   Procedure Laterality Date    PB RECONSTR TOTAL SHOULDER IMPLANT Left 4/9/2024    Procedure: LEFT REVERSE TOTAL SHOULDER ARTHROPLASTY;  Surgeon: Kirstie Conn M.D.;  Location: SURGERY [facility];  Service: Orthopedics    PB SHLDR ARTHROSCOP,SURG,W/ROTAT CUFF REPB Left 09/07/2022    Procedure: LEFT SHOULDER ARTHROSCOPY, SUBACROMIAL DECOMPRESSION, ROTATOR CUFF REPAIR, BALLOON ARTHROPLASTY;  Surgeon: Augusto Wilson M.D.;  Location: SURGERY [facility];  Service: Orthopedics    FL SHLDR ARTHROSCOP,PART ACROMIOPLAS Left 09/07/2022    Procedure: DECOMPRESSION, SHOULDER, ARTHROSCOPIC;  Surgeon: Augusto Wilson M.D.;  Location: SURGERY [facility];  Service: Orthopedics    FL UNLISTED PROC, ARTHROSCOPY Left 09/07/2022    Procedure: ARTHROPLASTY,BALLOON;  Surgeon: Augusto Wilson M.D.;  Location:  SURGERY [facility];  Service: Orthopedics    PB SHLDR ARTHROSCOP,SURG,W/ROTAT CUFF REPB Right 11/24/2020    Procedure: ARTHROSCOPY, SHOULDER, WITH ROTATOR CUFF REPAIR WITH DEBRIDEMENT OF GLENOHUMERAL JOINT;  Surgeon: Augusto Wilson M.D.;  Location: SURGERY [facility];  Service: Orthopedics    PB ARTHROSCOPY SHOULDER SURGICAL BICEPS TENODES* Right 11/24/2020    Procedure: ARTHROSCOPY, SHOULDER, WITH BICEPS  TENOTOMY;  Surgeon: Augusto Wilson M.D.;  Location: SURGERY [facility];  Service: Orthopedics    LA SHLDR ARTHROSCOP,PART ACROMIOPLAS Right 11/24/2020    Procedure: DECOMPRESSION, SUBACROMIAL SPACE;  Surgeon: Augusto Wilson M.D.;  Location: SURGERY [facility];  Service: Orthopedics    KNEE ARTHROSCOPY Right 2018    CATARACT PHACO WITH IOL Left 01/12/2016    Procedure: CATARACT PHACO WITH IOL ANTERIOR CHAMBER;  Surgeon: Aureliano Epperson M.D.;  Location: SURGERY SURG[facility];  Service:     VITRECTOMY ANTERIOR Left 10/13/2015    Procedure: VITRECTOMY ANTERIOR - ANTERIOR SYNECHIALYSIS, IRIS REPAIR WITH MCCANNEL SUTURES;  Surgeon: Aureliano Epperson M.D.;  Location: SURGERY SURGICAL [facility];  Service:     EYE SURGERY  10/03/2008    Performed by SHANTEL GARAY at SURGERY SAME DAY [facility]S    EYEBALL RUPTURE REPAIR  09/19/2008    Performed by SHANTEL GARAY at SURGERY [facility]S    ROTATOR CUFF REPAIR Bilateral 1996    bilateral rotator cuff repairs    COLONOSCOPY      OTHER  2000    right shoulder    TONSILLECTOMY  as a child       Family History:  Family History   Problem Relation Age of Onset    Hypertension Mother     Psychiatric Illness Mother     Colon Cancer Mother     Cancer Mother         Colon    Heart Disease Father     Thyroid Sister     Cancer Maternal Grandmother         Breast    Breast Cancer Maternal Grandmother     Cancer Maternal Grandfather         Lung from smoking    Lung Cancer Maternal Grandfather        Social History:  Social History     Socioeconomic  History    Marital status: Single     Spouse name: Not on file    Number of children: Not on file    Years of education: Not on file    Highest education level: Not on file   Occupational History    Not on file   Tobacco Use    Smoking status: Never    Smokeless tobacco: Former     Types: Chew     Quit date: 1990   Vaping Use    Vaping status: Never Used   Substance and Sexual Activity    Alcohol use: Not Currently     Comment: rare, twice/month    Drug use: Yes     Types: Inhaled, Marijuana     Comment: Occasional    Sexual activity: Yes     Partners: Female   Other Topics Concern    Not on file   Social History Narrative     - on workers comp right now for his shoulder     Social Drivers of Health     Financial Resource Strain: Not on file   Food Insecurity: Not on file   Transportation Needs: Not on file   Physical Activity: Not on file   Stress: Not on file   Social Connections: Not on file   Intimate Partner Violence: Not on file   Housing Stability: Not on file       Medications:  Current Outpatient Medications   Medication Sig Dispense Refill    tadalafil 20 MG tablet Take 1 Tablet by mouth as needed for Erectile Dysfunction. Take one tablet at least one hour prior to sexual activity. Maximum one per day. 30 Tablet 3    oxyCODONE-acetaminophen (PERCOCET) 5-325 MG Tab       Cholecalciferol (VITAMIN D-3 PO) Take  by mouth.      Cyanocobalamin (VITAMIN B-12 PO) Take  by mouth every 48 hours.       No current facility-administered medications for this visit.       Allergies:  No Known Allergies    Review of Systems:  Constitutional: Negative for fever, chills and malaise/fatigue.   HENT: Negative for congestion.    Eyes: Negative for pain.   Respiratory: Negative for cough and shortness of breath.    Cardiovascular: Negative for leg swelling.   Gastrointestinal: Negative for nausea, vomiting, abdominal pain and diarrhea.   Genitourinary: Negative for dysuria and hematuria.   Skin: Negative for  rash.   Neurological: Negative for dizziness, focal weakness and headaches.   Endo/Heme/Allergies: Does not bruise/bleed easily.   Psychiatric/Behavioral: Negative for depression.  The patient is not nervous/anxious.        Physical Exam:  There were no vitals filed for this visit.    GENERAL: well appearing, well nourished, NAD  RESP: respiratory effort normal  ABDOMEN: soft, nontender, nondistended, no masses or organomegaly  HERNIAS: no hernias found on exam  SKIN/LYMPH: normal coloration and turgor, no suspicious skin lesions noted  NEURO/PSYCH: alert, oriented, normal speech, no focal findings or movement disorder noted  EXTREMITIES: no pedal edema noted    Data Review:    Labs:  POCT UA   Lab Results   Component Value Date/Time    POCCOLOR yellow 11/05/2024 10:41 AM    POCAPPEAR clear 11/05/2024 10:41 AM    POCLEUKEST neg 11/05/2024 10:41 AM    POCNITRITE neg 11/05/2024 10:41 AM    POCUROBILIGE 0.2 11/05/2024 10:41 AM    POCPROTEIN neg 11/05/2024 10:41 AM    POCURPH 6.0 11/05/2024 10:41 AM    POCBLOOD small 11/05/2024 10:41 AM    POCSPGRV 1.015 11/05/2024 10:41 AM    POCKETONES neg 11/05/2024 10:41 AM    POCBILIRUBIN neg 11/05/2024 10:41 AM    POCGLUCUA neg 11/05/2024 10:41 AM      CBC   Lab Results   Component Value Date/Time    WBC 5.7 03/07/2024 0907    RBC 6.10 03/07/2024 0907    HEMOGLOBIN 17.6 03/07/2024 0907    HEMATOCRIT 50.7 03/07/2024 0907    MCV 83.1 03/07/2024 0907    MCH 28.9 03/07/2024 0907    MCHC 34.7 03/07/2024 0907    RDW 45.3 03/07/2024 0907    MPV 9.4 03/07/2024 0907    LYMPHOCYTES 21.2 (L) 09/19/2008 2115    MONOCYTES 8.5 09/19/2008 2115    EOSINOPHILS 1.9 09/19/2008 2115    BASOPHILS 0.5 09/19/2008 2115       CMP   Lab Results   Component Value Date/Time    SODIUM 139 03/07/2024 0907    POTASSIUM 4.2 03/07/2024 0907    CHLORIDE 103 03/07/2024 0907    CO2 25 03/07/2024 0907    ANION 11.0 03/07/2024 0907    GLUCOSE 103 (H) 03/07/2024 0907    BUN 17 03/07/2024 0907    CREATININE 0.70  03/07/2024 0907    GFRCKD 109 03/07/2024 0907    CALCIUM 9.3 03/07/2024 0907    CORRCALC 8.9 03/07/2024 0907    ASTSGOT 19 03/07/2024 0907    ALTSGPT 23 03/07/2024 0907    ALKPHOSPHAT 60 03/07/2024 0907    TBILIRUBIN 0.5 03/07/2024 0907    ALBUMIN 4.5 03/07/2024 0907    TOTPROTEIN 7.4 03/07/2024 0907    GLOBULIN 2.9 03/07/2024 0907    AGRATIO 1.6 03/07/2024 0907       Imaging:   US-RENAL  Order: [id number]   Status: Final result       Visible to patient: Yes (seen)       Next appt: 11/26/2024 at 10:00 AM in Urology (Kar Jin M.D.)       Dx: Hematuria, unspecified type    1 Result Note       1 Patient Communication  Details    Reading Physician Reading Date Result Priority   Vasu Hill M.D.  [phone] 10/18/2024      Narrative & Impression     10/18/2024 8:30 AM     HISTORY/REASON FOR EXAM:  Hematuria and right flank pain     TECHNIQUE/EXAM DESCRIPTION:  Renal ultrasound.     COMPARISON:  None     FINDINGS:     The right kidney measures 11.19 cm.     The left kidney measures 12.30 cm.     The bladder demonstrates no focal wall abnormality.  Post void residual of 11 mL.     IMPRESSION:     1.  Unremarkable kidneys.  2.  Minimal postvoid residual in the bladder.       Assessment: 54 y.o.male with a history of hypogonadism (now off of TRT, with return of normal serum testosterone) and erectile dysfunction without response to use of prn tadalafil. Also with a prior history of PSA elevation with spontaneous resolution and normal workup. Finally, he has had a few months of intermittent gross hematuria with small clots in the urine.     We discussed the differential diagnosis of gross and microscopic hematuria, including urinary tract infection, renal or bladder stones, trauma, rare entities like renal arteriovenous malformations or aneurysms, benign growths of the urinary tract, and malignancy including renal cell carcinoma, upper tract urothelial carcinoma, bladder cancer, and urethral cancer.      Workup  should include imaging of the upper urinary tract with and without contrast including delayed images. This is most typically done with a CT urogram, but when this is not possible (ie, iodine contrast allergy, CKD), can also be done with an MR urogram. We also recommend evaluation the lower urinary tract with cystoscopy, as the sensitivity of imaging studies for smaller lesions in the bladder and urethra is not sufficient.         Plan:    -CT urogram  -Cystoscopy   -Trial of sildenafil 100 mg PO prn, take on an empty stomach at least 30 minutes before sexual activity    Kar Jin MD

## 2024-11-14 ENCOUNTER — HOSPITAL ENCOUNTER (OUTPATIENT)
Dept: RADIOLOGY | Facility: MEDICAL CENTER | Age: 54
End: 2024-11-14
Attending: UROLOGY
Payer: MEDICAID

## 2024-11-14 DIAGNOSIS — R31.0 GROSS HEMATURIA: ICD-10-CM

## 2024-11-14 DIAGNOSIS — R31.9 HEMATURIA, UNSPECIFIED TYPE: ICD-10-CM

## 2024-11-14 PROCEDURE — 700117 HCHG RX CONTRAST REV CODE 255: Mod: UD | Performed by: UROLOGY

## 2024-11-14 PROCEDURE — 74178 CT ABD&PLV WO CNTR FLWD CNTR: CPT

## 2024-11-14 RX ADMIN — IOHEXOL 100 ML: 350 INJECTION, SOLUTION INTRAVENOUS at 09:45

## 2024-11-26 ENCOUNTER — PROCEDURE VISIT (OUTPATIENT)
Dept: UROLOGY | Facility: MEDICAL CENTER | Age: 54
End: 2024-11-26
Payer: MEDICAID

## 2024-11-26 DIAGNOSIS — R31.9 HEMATURIA, UNSPECIFIED TYPE: ICD-10-CM

## 2024-11-26 LAB
APPEARANCE UR: CLEAR
BILIRUB UR STRIP-MCNC: NORMAL MG/DL
COLOR UR AUTO: YELLOW
GLUCOSE UR STRIP.AUTO-MCNC: NORMAL MG/DL
KETONES UR STRIP.AUTO-MCNC: NORMAL MG/DL
LEUKOCYTE ESTERASE UR QL STRIP.AUTO: NORMAL
NITRITE UR QL STRIP.AUTO: NORMAL
PH UR STRIP.AUTO: 7 [PH] (ref 5–8)
PROT UR QL STRIP: NORMAL MG/DL
RBC UR QL AUTO: NORMAL
SP GR UR STRIP.AUTO: 1.02
UROBILINOGEN UR STRIP-MCNC: 0.2 MG/DL

## 2024-11-26 NOTE — PROGRESS NOTES
Flexible Cystoscopy Report    Patient name: Wil Schrader    Age: 54 y.o.    Procedure: Flexible cystourethroscopy    Pre-procedure diagnosis: Gross hematuria    Post-procedure diagnosis: BPH    Procedure indications: Wil Schrader is a 54 y.o. male with a history of intermittent gross hematuria with small clots in the urine, here for evaluation of the lower urinary tract. CT urogram demonstrated a normal upper urinary tract.     Procedure details: After providing a urine sample for a urinalysis to rule out active urinary tract infection, the patient was brought to the procedure room and placed in supine position. The external genitalia were then prepped and draped in usual sterile fashion. Lidocaine jelly was administered via the urethral meatus and held in place for approximately two minutes for local anesthesia.    After a procedure time-out to confirm the proper patient, procedure, consent, and availability of all necessary equipment, the case began by inserting a 16-[place] flexible cystoscope via the urethral meatus. Findings included:    Anterior urethra: normal  Posterior urethra: normal membranous urethra; moderate prostatic enlargement including obstructive lateral lobes and a small intravesical median lobe; normal bladder neck  Bladder: Bladder mucosa normal without masses, diverticuli, or trabeculations. No stones or other foreign bodies noted. Bilateral ureteral orifices were identified and found to have efflux of clear yellow urine.     The cystoscope was then carefully withdrawn from the bladder and urethra. The patient was cleaned and dried and allowed to void.     Assessment/Plan:  Normal urethra and bladder, with BPH including some large subepithelial veins on the prostate adenoma. This may be the source of his episodic bleeding. No evidence on CT imaging or today's cystoscopy of urologic malignancy.     Can consider repeat hematuria workup if persistent episodes in 3-5 years, but for now I  reassured the patient about the negative workup.     Follow up in 1 year or sooner as needed    Kar Jin MD

## 2025-01-02 ENCOUNTER — HOSPITAL ENCOUNTER (OUTPATIENT)
Dept: LAB | Facility: MEDICAL CENTER | Age: 55
End: 2025-01-02
Attending: FAMILY MEDICINE
Payer: MEDICAID

## 2025-01-02 ENCOUNTER — OFFICE VISIT (OUTPATIENT)
Dept: MEDICAL GROUP | Facility: MEDICAL CENTER | Age: 55
End: 2025-01-02
Attending: FAMILY MEDICINE
Payer: MEDICAID

## 2025-01-02 VITALS
TEMPERATURE: 98.3 F | RESPIRATION RATE: 16 BRPM | DIASTOLIC BLOOD PRESSURE: 76 MMHG | WEIGHT: 220 LBS | HEART RATE: 68 BPM | BODY MASS INDEX: 35.36 KG/M2 | OXYGEN SATURATION: 96 % | SYSTOLIC BLOOD PRESSURE: 118 MMHG | HEIGHT: 66 IN

## 2025-01-02 DIAGNOSIS — D22.9 ENLARGED SKIN MOLE: ICD-10-CM

## 2025-01-02 DIAGNOSIS — Z52.008 BLOOD DONOR: ICD-10-CM

## 2025-01-02 LAB
BASOPHILS # BLD AUTO: 0.7 % (ref 0–1.8)
BASOPHILS # BLD: 0.04 K/UL (ref 0–0.12)
EOSINOPHIL # BLD AUTO: 0.18 K/UL (ref 0–0.51)
EOSINOPHIL NFR BLD: 3 % (ref 0–6.9)
ERYTHROCYTE [DISTWIDTH] IN BLOOD BY AUTOMATED COUNT: 42.5 FL (ref 35.9–50)
HCT VFR BLD AUTO: 47.7 % (ref 42–52)
HGB BLD-MCNC: 15.9 G/DL (ref 14–18)
IMM GRANULOCYTES # BLD AUTO: 0.01 K/UL (ref 0–0.11)
IMM GRANULOCYTES NFR BLD AUTO: 0.2 % (ref 0–0.9)
LYMPHOCYTES # BLD AUTO: 1.36 K/UL (ref 1–4.8)
LYMPHOCYTES NFR BLD: 22.3 % (ref 22–41)
MCH RBC QN AUTO: 29.5 PG (ref 27–33)
MCHC RBC AUTO-ENTMCNC: 33.3 G/DL (ref 32.3–36.5)
MCV RBC AUTO: 88.5 FL (ref 81.4–97.8)
MONOCYTES # BLD AUTO: 0.59 K/UL (ref 0–0.85)
MONOCYTES NFR BLD AUTO: 9.7 % (ref 0–13.4)
NEUTROPHILS # BLD AUTO: 3.92 K/UL (ref 1.82–7.42)
NEUTROPHILS NFR BLD: 64.1 % (ref 44–72)
NRBC # BLD AUTO: 0 K/UL
NRBC BLD-RTO: 0 /100 WBC (ref 0–0.2)
PLATELET # BLD AUTO: 327 K/UL (ref 164–446)
PMV BLD AUTO: 9.7 FL (ref 9–12.9)
RBC # BLD AUTO: 5.39 M/UL (ref 4.7–6.1)
WBC # BLD AUTO: 6.1 K/UL (ref 4.8–10.8)

## 2025-01-02 PROCEDURE — 3074F SYST BP LT 130 MM HG: CPT | Performed by: FAMILY MEDICINE

## 2025-01-02 PROCEDURE — 3078F DIAST BP <80 MM HG: CPT | Performed by: FAMILY MEDICINE

## 2025-01-02 PROCEDURE — 80048 BASIC METABOLIC PNL TOTAL CA: CPT

## 2025-01-02 PROCEDURE — 36415 COLL VENOUS BLD VENIPUNCTURE: CPT

## 2025-01-02 PROCEDURE — 85025 COMPLETE CBC W/AUTO DIFF WBC: CPT

## 2025-01-02 PROCEDURE — 99213 OFFICE O/P EST LOW 20 MIN: CPT | Performed by: FAMILY MEDICINE

## 2025-01-02 ASSESSMENT — FIBROSIS 4 INDEX: FIB4 SCORE: 0.73

## 2025-01-02 NOTE — PROGRESS NOTES
"Verbal consent was acquired by the patient to use [employer] ambient listening note generation during this visit.    Subjective   Chief Complaint   Patient presents with    Follow-Up        HPI:   Wil presents today with    History of Present Illness  The patient is here requesting lab work. He regularly donates blood and plasma, and on a previous donation attempt, there was an issue with the equipment, and they were unable to return his blood in a timely manner. He ended up syncopized. He otherwise feels well and is just requesting basic blood work as required by the donation center to ensure that he is fit to donate still. He also is requesting removal of moles that he has on his right flank and on his inner thigh. They do not bother him; however, sometimes when he is playing with his dog, they accidentally pull on the mole when they are roughhousing, and he would like them to be removed.      Health Maintenance Due   Topic Date Due    Zoster (Shingles) Vaccines (1 of 2) Never done    Influenza Vaccine (1) 09/01/2024    COVID-19 Vaccine (1 - 2024-25 season) Never done       Objective   Social History     Tobacco Use    Smoking status: Never    Smokeless tobacco: Former     Types: Chew     Quit date: 1990   Vaping Use    Vaping status: Never Used   Substance Use Topics    Alcohol use: Not Currently     Comment: rare, twice/month    Drug use: Yes     Types: Inhaled, Marijuana     Comment: Occasional       Exam:  /76   Pulse 68   Temp 36.8 °C (98.3 °F)   Resp 16   Ht 1.676 m (5' 5.98\")   Wt 99.8 kg (220 lb)   BMI 35.53 kg/m²     Physical Exam  Constitutional: Alert, no distress  Skin:     Eye: Conjunctiva clear, lids normal  Respiratory: Unlabored respiratory effort, no cough  MSK: Normal gait, moves all extremities  Psych: Alert and oriented x3, normal affect and mood    No Known Allergies    [id number] - [place], [place] - [address] AT [facility] [address]  [address] [place] [address]  Phone: [phone] Fax: [phone]    Current Outpatient Medications   Medication Sig Dispense Refill    sildenafil citrate (VIAGRA) 100 MG tablet Take 1 Tablet by mouth 1 time a day as needed for Erectile Dysfunction. Take on an empty stomach at least 30 minutes before sexual activity as needed 30 Tablet 3    oxyCODONE-acetaminophen (PERCOCET) 5-325 MG Tab       Cholecalciferol (VITAMIN D-3 PO) Take  by mouth.      Cyanocobalamin (VITAMIN B-12 PO) Take  by mouth every 48 hours.       No current facility-administered medications for this visit.       1. Blood donor  CBC WITH DIFFERENTIAL    Basic Metabolic Panel      2. Enlarged skin mole          Assessment & Plan  1. Blood donor.  He is otherwise feeling well and is asymptomatic. Basic blood work, including CBC and BMP, has been ordered to ensure his ability to return to donation. The results will be faxed over to the donation center once reviewed and received.    2. Enlarged skin moles.  He has a chronic history of benign-appearing moles on his right flank and left thigh. He reports irritation caused by contact, especially when playing with his dog. He is recommended to follow up with his PCP for a procedure visit to remove the moles.        Return to PCP for mole removal.    Please note that this note was created using voice recognition software. I have made every reasonable attempt to correct obvious errors, but I expect that there are errors of grammar and possibly content that I did not discover before finalizing the note.

## 2025-01-03 LAB
ANION GAP SERPL CALC-SCNC: 7 MMOL/L (ref 7–16)
BUN SERPL-MCNC: 13 MG/DL (ref 8–22)
CALCIUM SERPL-MCNC: 8.5 MG/DL (ref 8.5–10.5)
CHLORIDE SERPL-SCNC: 106 MMOL/L (ref 96–112)
CO2 SERPL-SCNC: 25 MMOL/L (ref 20–33)
CREAT SERPL-MCNC: 0.7 MG/DL (ref 0.5–1.4)
GFR SERPLBLD CREATININE-BSD FMLA CKD-EPI: 108 ML/MIN/1.73 M 2
GLUCOSE SERPL-MCNC: 103 MG/DL (ref 65–99)
POTASSIUM SERPL-SCNC: 4.3 MMOL/L (ref 3.6–5.5)
SODIUM SERPL-SCNC: 138 MMOL/L (ref 135–145)

## 2025-02-18 ENCOUNTER — OFFICE VISIT (OUTPATIENT)
Dept: MEDICAL GROUP | Facility: MEDICAL CENTER | Age: 55
End: 2025-02-18
Payer: MEDICAID

## 2025-02-18 ENCOUNTER — HOSPITAL ENCOUNTER (OUTPATIENT)
Dept: LAB | Facility: MEDICAL CENTER | Age: 55
End: 2025-02-18
Payer: MEDICAID

## 2025-02-18 VITALS
HEART RATE: 85 BPM | TEMPERATURE: 98.9 F | WEIGHT: 215 LBS | BODY MASS INDEX: 34.72 KG/M2 | SYSTOLIC BLOOD PRESSURE: 108 MMHG | DIASTOLIC BLOOD PRESSURE: 70 MMHG | OXYGEN SATURATION: 95 % | RESPIRATION RATE: 16 BRPM

## 2025-02-18 DIAGNOSIS — Z13.0 SCREENING FOR ENDOCRINE, NUTRITIONAL, METABOLIC AND IMMUNITY DISORDER: ICD-10-CM

## 2025-02-18 DIAGNOSIS — L91.8 SKIN TAGS, MULTIPLE ACQUIRED: ICD-10-CM

## 2025-02-18 DIAGNOSIS — R53.83 OTHER FATIGUE: ICD-10-CM

## 2025-02-18 DIAGNOSIS — Z13.29 SCREENING FOR ENDOCRINE, NUTRITIONAL, METABOLIC AND IMMUNITY DISORDER: ICD-10-CM

## 2025-02-18 DIAGNOSIS — Z13.21 SCREENING FOR ENDOCRINE, NUTRITIONAL, METABOLIC AND IMMUNITY DISORDER: ICD-10-CM

## 2025-02-18 DIAGNOSIS — Z13.228 SCREENING FOR ENDOCRINE, NUTRITIONAL, METABOLIC AND IMMUNITY DISORDER: ICD-10-CM

## 2025-02-18 LAB
ERYTHROCYTE [DISTWIDTH] IN BLOOD BY AUTOMATED COUNT: 40.7 FL (ref 35.9–50)
HCT VFR BLD AUTO: 49 % (ref 42–52)
HGB BLD-MCNC: 16.4 G/DL (ref 14–18)
MCH RBC QN AUTO: 29.7 PG (ref 27–33)
MCHC RBC AUTO-ENTMCNC: 33.5 G/DL (ref 32.3–36.5)
MCV RBC AUTO: 88.8 FL (ref 81.4–97.8)
PLATELET # BLD AUTO: 305 K/UL (ref 164–446)
PMV BLD AUTO: 9.7 FL (ref 9–12.9)
RBC # BLD AUTO: 5.52 M/UL (ref 4.7–6.1)
WBC # BLD AUTO: 8.3 K/UL (ref 4.8–10.8)

## 2025-02-18 PROCEDURE — 36415 COLL VENOUS BLD VENIPUNCTURE: CPT

## 2025-02-18 PROCEDURE — 11200 RMVL SKIN TAGS UP TO&INC 15: CPT

## 2025-02-18 PROCEDURE — 99213 OFFICE O/P EST LOW 20 MIN: CPT | Mod: 25

## 2025-02-18 PROCEDURE — 84403 ASSAY OF TOTAL TESTOSTERONE: CPT

## 2025-02-18 PROCEDURE — 84270 ASSAY OF SEX HORMONE GLOBUL: CPT

## 2025-02-18 PROCEDURE — 99213 OFFICE O/P EST LOW 20 MIN: CPT

## 2025-02-18 PROCEDURE — 84402 ASSAY OF FREE TESTOSTERONE: CPT

## 2025-02-18 PROCEDURE — 85027 COMPLETE CBC AUTOMATED: CPT

## 2025-02-18 PROCEDURE — 83036 HEMOGLOBIN GLYCOSYLATED A1C: CPT

## 2025-02-18 PROCEDURE — 80053 COMPREHEN METABOLIC PANEL: CPT

## 2025-02-18 PROCEDURE — 84153 ASSAY OF PSA TOTAL: CPT

## 2025-02-18 PROCEDURE — 84443 ASSAY THYROID STIM HORMONE: CPT

## 2025-02-18 PROCEDURE — 82306 VITAMIN D 25 HYDROXY: CPT

## 2025-02-18 PROCEDURE — 80061 LIPID PANEL: CPT

## 2025-02-18 ASSESSMENT — PATIENT HEALTH QUESTIONNAIRE - PHQ9: CLINICAL INTERPRETATION OF PHQ2 SCORE: 0

## 2025-02-18 ASSESSMENT — FIBROSIS 4 INDEX: FIB4 SCORE: 0.68

## 2025-02-19 LAB
25(OH)D3 SERPL-MCNC: 26 NG/ML (ref 30–100)
ALBUMIN SERPL BCP-MCNC: 3.9 G/DL (ref 3.2–4.9)
ALBUMIN/GLOB SERPL: 1.8 G/DL
ALP SERPL-CCNC: 51 U/L (ref 30–99)
ALT SERPL-CCNC: 23 U/L (ref 2–50)
ANION GAP SERPL CALC-SCNC: 9 MMOL/L (ref 7–16)
AST SERPL-CCNC: 27 U/L (ref 12–45)
BILIRUB SERPL-MCNC: <0.2 MG/DL (ref 0.1–1.5)
BUN SERPL-MCNC: 26 MG/DL (ref 8–22)
CALCIUM ALBUM COR SERPL-MCNC: 9.1 MG/DL (ref 8.5–10.5)
CALCIUM SERPL-MCNC: 9 MG/DL (ref 8.5–10.5)
CHLORIDE SERPL-SCNC: 109 MMOL/L (ref 96–112)
CHOLEST SERPL-MCNC: 184 MG/DL (ref 100–199)
CO2 SERPL-SCNC: 24 MMOL/L (ref 20–33)
CREAT SERPL-MCNC: 1.03 MG/DL (ref 0.5–1.4)
EST. AVERAGE GLUCOSE BLD GHB EST-MCNC: 103 MG/DL
FASTING STATUS PATIENT QL REPORTED: NORMAL
GFR SERPLBLD CREATININE-BSD FMLA CKD-EPI: 85 ML/MIN/1.73 M 2
GLOBULIN SER CALC-MCNC: 2.2 G/DL (ref 1.9–3.5)
GLUCOSE SERPL-MCNC: 110 MG/DL (ref 65–99)
HBA1C MFR BLD: 5.2 % (ref 4–5.6)
HDLC SERPL-MCNC: 33 MG/DL
LDLC SERPL CALC-MCNC: 122 MG/DL
POTASSIUM SERPL-SCNC: 4.2 MMOL/L (ref 3.6–5.5)
PROT SERPL-MCNC: 6.1 G/DL (ref 6–8.2)
PSA SERPL DL<=0.01 NG/ML-MCNC: 2.45 NG/ML (ref 0–4)
SODIUM SERPL-SCNC: 142 MMOL/L (ref 135–145)
TRIGL SERPL-MCNC: 146 MG/DL (ref 0–149)
TSH SERPL DL<=0.005 MIU/L-ACNC: 5.07 UIU/ML (ref 0.38–5.33)

## 2025-02-19 NOTE — PROGRESS NOTES
Verbal consent was acquired by the patient to use [employer] ambient listening note generation during this visit     Subjective:     CC:  Diagnoses of Skin tags, multiple acquired, Other fatigue, and Screening for endocrine, nutritional, metabolic and immunity disorder were pertinent to this visit.    HISTORY OF THE PRESENT ILLNESS: Patient is a 55 y.o. male.     History of Present Illness  The patient presents for skin tags, fatigue, and snoring.    He reports the presence of a skin tag on his inner thigh, which has increased in size over the past 6 to 7 months. He also mentions a similar-sized skin tag on his leg. The skin tag occasionally gets caught during washing, causing discomfort. Additionally, he recounts an incident where his dog inadvertently tore off the skin tag with its toenail, resulting in bleeding. Despite attempts to manage the condition with over-the-counter treatments, there has been no improvement.    He has a history of thyroid issues, which have previously caused fatigue. He expresses interest in undergoing a blood panel test to evaluate his current health status.    He also reports low testosterone levels and persistent tiredness, even after adequate sleep. He occasionally experiences daytime sleepiness and snoring. Previous sleep studies have recommended the use of a CPAP machine, but he was unable to comply with this suggestion.    Health Maintenance: reviewed and completed per patient preference.     ROS:   PER HPI.           Social History     Socioeconomic History    Marital status: Single     Spouse name: Not on file    Number of children: Not on file    Years of education: Not on file    Highest education level: Not on file   Occupational History    Not on file   Tobacco Use    Smoking status: Never    Smokeless tobacco: Former     Types: Chew     Quit date: 1990   Vaping Use    Vaping status: Never Used   Substance and Sexual Activity    Alcohol use: Not Currently     Comment: rare,  twice/month    Drug use: Yes     Types: Inhaled, Marijuana     Comment: Occasional    Sexual activity: Yes     Partners: Female   Other Topics Concern    Not on file   Social History Narrative     - on workers comp right now for his shoulder     Social Drivers of Health     Financial Resource Strain: Not on file   Food Insecurity: Not on file   Transportation Needs: Not on file   Physical Activity: Not on file   Stress: Not on file   Social Connections: Not on file   Intimate Partner Violence: Not on file   Housing Stability: Not on file      No Known Allergies         Current Outpatient Medications:     Cholecalciferol (VITAMIN D-3 PO), Take  by mouth.   Objective:     Exam: /70 (BP Location: Right arm, Patient Position: Sitting, BP Cuff Size: Adult)   Pulse 85   Temp 37.2 °C (98.9 °F) (Temporal)   Resp 16   Wt 97.5 kg (215 lb)   SpO2 95%  Body mass index is 34.72 kg/m².    Physical Exam:  Constitutional: Alert, no distress, well-groomed.  Skin: Warm, dry, good turgor, no rashes in visible areas. Skin tag  Eye: Equal, round and reactive, conjunctiva clear, lids normal.  ENMT: Lips without lesions, good dentition, moist mucous membranes.  Neck: Trachea midline, no masses, no thyromegaly.  Respiratory: Unlabored respiratory effort, no cough.  Abd: soft, non tender, non distended, normal BS  MSK: Normal gait, moves all extremities.  Neuro: Grossly non-focal.   Psych: Alert and oriented x3, normal affect and mood.    CRYOTHERAPY:  Discussed the benign nature of these lesions.     Verbal consent was obtained. Immediately prior to procedure, a time out was called to verify the correct patient, procedure, equipment, support staff and site/side marked as required. Pre-procedure pain reported as 0/10.     cryotherapy performed using liquid nitrogen, freeze-thaw-freeze technique x 3.  Patient tolerated the procedure well.  Post-procedure was 0/10. Aftercare as well as potential for blistering was  discussed.  I explained that the procedure may need to be repeated if there is not complete resolution.      Labs: Reviewed    Assessment & Plan:   55 y.o. male with the following -    1. Skin tags, multiple acquired    2. Other fatigue    3. Screening for endocrine, nutritional, metabolic and immunity disorder  - Comp Metabolic Panel; Future  - CBC WITHOUT DIFFERENTIAL; Future  - HEMOGLOBIN A1C; Future  - Lipid Profile; Future  - TSH WITH REFLEX TO FT4; Future  - VITAMIN D,25 HYDROXY (DEFICIENCY); Future  - PROSTATE SPECIFIC AG DIAGNOSTIC; Future  - Testosterone, Free & Total, Adult Male (w/SHBG); Future      Assessment & Plan  1. Skin tags.  Cryotherapy was performed on the skin tags during this visit. The treated skin tags are expected to scab over and fall off within a week. If the skin tags do not completely fall off, a follow-up treatment will be considered.    2. Fatigue.  A comprehensive blood panel, including tests for testosterone, thyroid function, cholesterol, and a metabolic panel, has been ordered. The patient is advised to complete the blood work before the next visit.    3. Snoring.    Follow-up  The patient will follow up in 2 weeks.    PROCEDURE  Cryotherapy was performed on the skin tags during this visit.        Return in about 2 weeks (around 3/4/2025).    Please note that this dictation was created using voice recognition software. I have made every reasonable attempt to correct obvious errors, but I expect that there are errors of grammar and possibly content that I did not discover before finalizing the note.

## 2025-02-21 LAB
SHBG SERPL-SCNC: 38 NMOL/L (ref 19–76)
TESTOST FREE MFR SERPL: 1.7 % (ref 1.6–2.9)
TESTOST FREE SERPL-MCNC: 54 PG/ML (ref 47–244)
TESTOST SERPL-MCNC: 321 NG/DL (ref 300–890)

## 2025-02-26 ENCOUNTER — RESULTS FOLLOW-UP (OUTPATIENT)
Dept: MEDICAL GROUP | Facility: MEDICAL CENTER | Age: 55
End: 2025-02-26
Payer: MEDICAID

## 2025-03-12 ENCOUNTER — OFFICE VISIT (OUTPATIENT)
Dept: MEDICAL GROUP | Facility: MEDICAL CENTER | Age: 55
End: 2025-03-12
Payer: MEDICAID

## 2025-03-12 VITALS
WEIGHT: 217.1 LBS | BODY MASS INDEX: 32.15 KG/M2 | HEIGHT: 69 IN | SYSTOLIC BLOOD PRESSURE: 100 MMHG | RESPIRATION RATE: 16 BRPM | OXYGEN SATURATION: 93 % | HEART RATE: 85 BPM | DIASTOLIC BLOOD PRESSURE: 52 MMHG | TEMPERATURE: 98.1 F

## 2025-03-12 DIAGNOSIS — R06.83 SNORING: ICD-10-CM

## 2025-03-12 DIAGNOSIS — M25.561 ACUTE PAIN OF RIGHT KNEE: ICD-10-CM

## 2025-03-12 DIAGNOSIS — E78.5 DYSLIPIDEMIA: ICD-10-CM

## 2025-03-12 DIAGNOSIS — E55.9 VITAMIN D DEFICIENCY: ICD-10-CM

## 2025-03-12 PROCEDURE — 99214 OFFICE O/P EST MOD 30 MIN: CPT

## 2025-03-12 PROCEDURE — 3078F DIAST BP <80 MM HG: CPT

## 2025-03-12 PROCEDURE — 99212 OFFICE O/P EST SF 10 MIN: CPT

## 2025-03-12 PROCEDURE — 3074F SYST BP LT 130 MM HG: CPT

## 2025-03-12 RX ORDER — ERGOCALCIFEROL 1.25 MG/1
50000 CAPSULE ORAL
Qty: 12 CAPSULE | Refills: 0 | Status: SHIPPED | OUTPATIENT
Start: 2025-03-12

## 2025-03-12 ASSESSMENT — FIBROSIS 4 INDEX: FIB4 SCORE: 1.03

## 2025-03-12 NOTE — PROGRESS NOTES
"Subjective:     CC: Annual Visit and knee pain    HPI:   Wil presents today with     Right knee pain following a trip while helping his mom.  He reports that he has been able to walk on it fine and occasionally it pinches.  This happened about 2 weeks ago.  After which she noticed a little bit of swelling but no bruising.  No limited range of motion or weakness of the knee noted.    Dyslipidemia- over the last year the patient has worked on dietary and lifestyle modifications.  He cut all red meat out of his diet as well as only eats chicken and fish.  He is also been working on crosstraining and increasing his physical activity.    ROS:  Per HPI.    Please see HPI for additional ROS.       Objective:     Exam:  /52 (BP Location: Left arm, Patient Position: Sitting, BP Cuff Size: Adult)   Pulse 85   Temp 36.7 °C (98.1 °F) (Temporal)   Resp 16   Ht 1.753 m (5' 9\")   Wt 98.5 kg (217 lb 1.6 oz)   SpO2 93%   BMI 32.06 kg/m²  Body mass index is 32.06 kg/m².    Physical Exam:  Constitutional: Alert, no distress, well-groomed.  Skin: Warm, dry, good turgor, no rashes in visible areas.  Eye: Equal, round and reactive, conjunctiva clear, lids normal.  ENMT: Lips without lesions, good dentition, moist mucous membranes.  Neck: Trachea midline, no masses, no thyromegaly.  Respiratory: Unlabored respiratory effort, no cough.  Abd: soft, non tender, non distended, normal BS  MSK: Normal gait, moves all extremities.  MSK: Patient is able to bear weight on right foot. Slightly antalgic gait.  Right Knee: Full ROM, full strength, TTP -, edema +  Neuro: Grossly non-focal.   Psych: Alert and oriented x3, normal affect and mood.    STOPBANG - Sleep Apnea Screening      Flowsheet Row Most Recent Value   S - Have you been told that you SNORE? Yes   T - Are you often TIRED during the day? Yes   O - Do you know if you stop breathing or has anyone witnessed you stop breathing while you were asleep? (OBSTRUCTION) Yes   P - Do " you have high blood PRESSURE or on medication to control high blood pressure? No   B - Is your Body Mass Index greater than 35? (BMI) No   A - Are you 50 years old or older? (AGE) Yes   N - Are you a male with a NECK circumference greater than 17 inches, or a female with a neck circumference greater than 16 inches? No   G - Are you male? (GENDER) Yes   STOPBANG Total Score 5   DENNIS Risk High Risk                Labs: Reviewed    Assessment & Plan:     55 y.o. male with the following -     1. Acute pain of right knee  Acute, stable. Knee exam with no abnormal findings. Given that his knee is stable without obvious deformities, he was advised to rest, ice, compress and elevate like he has been doing and follow up as needed.     2. Dyslipidemia  In depth discussion about the importance of keeping levels within normal limits and the negative effects that can occur such as heart attack, stroke and other vascular disease. Patient and provider have agreed to a three month trial of lifestyle modification to gain control of the levels. This includes low fat, low carbohydrate diet with increased fruits and vegetables. Exercise for 30 minutes continuous physical activity as they can tolerate. We will recheck labs in 3 months. If levels are not significantly improved we could consider statin therapy but the patient has significantly improved with diet and lifestyle changes. Patient is in agreement with this plan.     3. Snoring  Acute on chronic issue, given his ongoing fatigue and STOP bang score of 5, he is high risk for sleep apnea. We will get an overnight home sleep study completed and follow up as appropriate.   - Overnight Home Sleep Study; Future    4. Vitamin D deficiency  - ergocalciferol (DRISDOL) 74320 UNIT capsule; Take 1 Capsule by mouth every 7 days.  Dispense: 12 Capsule; Refill: 0      Return if symptoms worsen or fail to improve.    Please note that this dictation was created using voice recognition software. I  have made every reasonable attempt to correct obvious errors, but I expect that there are errors of grammar and possibly content that I did not discover before finalizing the note.

## 2025-03-13 DIAGNOSIS — R53.83 OTHER FATIGUE: ICD-10-CM

## 2025-03-13 NOTE — Clinical Note
REFERRAL APPROVAL NOTICE         Sent on March 13, 2025                   Wil Gutierrez Raciel  [address] [facility] [address]                   Dear Mr. Schrader,    After a careful review of the medical information and benefit coverage, [facility] has processed your referral. See below for additional details.    If applicable, you must be actively enrolled with your insurance for coverage of the authorized service. If you have any questions regarding your coverage, please contact your insurance directly.    REFERRAL INFORMATION   Referral #:  [id number]  Referred-To Department    Referred-By Provider:  Pulmonary and Sleep Medicine    ALEX Little   [facility]      [address]  [facility] [address]  [phone] [address] [facility] [id number] [phone]    Referral Start Date:  03/12/2025  Referral End Date:   03/12/2026             SCHEDULING  If you do not already have an appointment, please call [phone] to make an appointment.     MORE INFORMATION  If you do not already have a [employer] account, sign up at: [email or web address]  You can access your medical information, make appointments, see lab results, billing information, and more.  If you have questions regarding this referral, please contact  the [facility] Referrals department at:             [phone]. Monday - Friday 8:00AM - 5:00PM.     Sincerely,    [facility]

## 2025-04-02 ENCOUNTER — APPOINTMENT (OUTPATIENT)
Dept: ADMISSIONS | Facility: MEDICAL CENTER | Age: 55
End: 2025-04-02
Attending: ORTHOPAEDIC SURGERY
Payer: COMMERCIAL

## 2025-04-09 ENCOUNTER — PRE-ADMISSION TESTING (OUTPATIENT)
Dept: ADMISSIONS | Facility: MEDICAL CENTER | Age: 55
End: 2025-04-09
Attending: ORTHOPAEDIC SURGERY
Payer: COMMERCIAL

## 2025-04-09 VITALS — HEIGHT: 66 IN | BODY MASS INDEX: 35.04 KG/M2

## 2025-04-09 DIAGNOSIS — Z01.812 PRE-OPERATIVE LABORATORY EXAMINATION: ICD-10-CM

## 2025-04-09 DIAGNOSIS — Z01.810 PRE-OPERATIVE CARDIOVASCULAR EXAMINATION: ICD-10-CM

## 2025-04-09 NOTE — PREPROCEDURE INSTRUCTIONS
PreAdmit Telephone Appointment: Reviewed the Preparing for your procedure handout with patient. Patient instructed per pharmacy guidelines regarding taking, holding or contacting provider for instructions on regularly prescribed medications before surgery.    Confirmed with patient where to check in morning of surgery.     Testing appointment and Nurse Navigator appointment 4/11.

## 2025-04-09 NOTE — PREADMIT AVS NOTE
Current Medications   Medication Instructions    Vitamin D-Vitamin K (VITAMIN K2-VITAMIN D3 PO) Stop 7 days before surgery    ergocalciferol (DRISDOL) 13518 UNIT capsule Stop 7 days before surgery

## 2025-04-11 ENCOUNTER — PRE-ADMISSION TESTING (OUTPATIENT)
Dept: ADMISSIONS | Facility: MEDICAL CENTER | Age: 55
End: 2025-04-11
Attending: ORTHOPAEDIC SURGERY
Payer: COMMERCIAL

## 2025-04-11 VITALS — BODY MASS INDEX: 33.27 KG/M2 | HEIGHT: 67 IN | WEIGHT: 212 LBS

## 2025-04-11 DIAGNOSIS — Z01.810 PRE-OPERATIVE CARDIOVASCULAR EXAMINATION: ICD-10-CM

## 2025-04-11 DIAGNOSIS — Z01.812 PRE-OPERATIVE LABORATORY EXAMINATION: ICD-10-CM

## 2025-04-11 LAB
ANION GAP SERPL CALC-SCNC: 9 MMOL/L (ref 7–16)
BUN SERPL-MCNC: 27 MG/DL (ref 8–22)
CALCIUM SERPL-MCNC: 9.2 MG/DL (ref 8.5–10.5)
CHLORIDE SERPL-SCNC: 106 MMOL/L (ref 96–112)
CO2 SERPL-SCNC: 24 MMOL/L (ref 20–33)
CREAT SERPL-MCNC: 0.93 MG/DL (ref 0.5–1.4)
EKG IMPRESSION: NORMAL
ERYTHROCYTE [DISTWIDTH] IN BLOOD BY AUTOMATED COUNT: 38.8 FL (ref 35.9–50)
GFR SERPLBLD CREATININE-BSD FMLA CKD-EPI: 96 ML/MIN/1.73 M 2
GLUCOSE SERPL-MCNC: 87 MG/DL (ref 65–99)
HCT VFR BLD AUTO: 46.7 % (ref 42–52)
HGB BLD-MCNC: 16 G/DL (ref 14–18)
MCH RBC QN AUTO: 29.6 PG (ref 27–33)
MCHC RBC AUTO-ENTMCNC: 34.3 G/DL (ref 32.3–36.5)
MCV RBC AUTO: 86.5 FL (ref 81.4–97.8)
PLATELET # BLD AUTO: 304 K/UL (ref 164–446)
PMV BLD AUTO: 10 FL (ref 9–12.9)
POTASSIUM SERPL-SCNC: 3.9 MMOL/L (ref 3.6–5.5)
RBC # BLD AUTO: 5.4 M/UL (ref 4.7–6.1)
SCCMEC + MECA PNL NOSE NAA+PROBE: NEGATIVE
SCCMEC + MECA PNL NOSE NAA+PROBE: NEGATIVE
SODIUM SERPL-SCNC: 139 MMOL/L (ref 135–145)
WBC # BLD AUTO: 7.8 K/UL (ref 4.8–10.8)

## 2025-04-11 PROCEDURE — 87640 STAPH A DNA AMP PROBE: CPT

## 2025-04-11 PROCEDURE — 93010 ELECTROCARDIOGRAM REPORT: CPT | Performed by: INTERNAL MEDICINE

## 2025-04-11 PROCEDURE — 36415 COLL VENOUS BLD VENIPUNCTURE: CPT

## 2025-04-11 PROCEDURE — 80048 BASIC METABOLIC PNL TOTAL CA: CPT

## 2025-04-11 PROCEDURE — 87641 MR-STAPH DNA AMP PROBE: CPT

## 2025-04-11 PROCEDURE — 85027 COMPLETE CBC AUTOMATED: CPT

## 2025-04-11 PROCEDURE — 93005 ELECTROCARDIOGRAM TRACING: CPT | Mod: TC

## 2025-04-11 ASSESSMENT — FIBROSIS 4 INDEX: FIB4 SCORE: 1.03

## 2025-04-11 NOTE — DISCHARGE PLANNING
DISCHARGE PLANNING NOTE - TOTAL JOINT    Procedure: RIGHT REVERSE TOTAL SHOULDER ARTHROPLASTY  Procedure Date: 4/25/2025  Insurance: Payor: SALEEM / Plan: NILA MONGE / Product Type: *No Product type* /    Equipment currently available at home?   Ice machine , Hand held shower head  Steps into the home? 2  Steps within the home? 0  Toilet height? ADA  Type of shower? walk-in shower  Home Oxygen? No  Portable tank?    Oxygen Provider:  Planning same day discharge: Yes    Is Outpatient Physical Therapy set up after surgery? Yes  Did you take the Total Joint Class and where or did you receive an Educational booklet? Yes, received NAON book.  Who will be your transportation home on day of discharge? Blue- girlfriend    Have you made arrangements to have someone stay with you at home for the first 3 days following discharge, and if so, whom? Blue- girlfriend    Have you notified your surgeon that you do not have transportation or someone to help you after discharge?N/A    Are you planning on going to a transitional care facility, for example a skilled nursing facility, post operatively for rehab, and if so, have you contacted your insurance plan to see if they cover this? No      Met with the pt. Pt given a copy of Home Safety Checklist, Equipment Resource Guide, CHG scrub kit and instructions. Expected process in Recovery Room and dc criteria discussed with pt. All questions answered and verbalizes understanding of all instructions. No dc needs identified at this time. Anticipate dc to home without barriers. MRSA swab obtained.

## 2025-04-16 ENCOUNTER — HOME STUDY (OUTPATIENT)
Dept: SLEEP MEDICINE | Facility: MEDICAL CENTER | Age: 55
End: 2025-04-16
Payer: MEDICAID

## 2025-04-16 DIAGNOSIS — R06.83 SNORING: ICD-10-CM

## 2025-04-16 PROCEDURE — 95800 SLP STDY UNATTENDED: CPT | Performed by: STUDENT IN AN ORGANIZED HEALTH CARE EDUCATION/TRAINING PROGRAM

## 2025-04-22 ENCOUNTER — HOSPITAL ENCOUNTER (OUTPATIENT)
Dept: RADIOLOGY | Facility: MEDICAL CENTER | Age: 55
End: 2025-04-22
Attending: ORTHOPAEDIC SURGERY
Payer: COMMERCIAL

## 2025-04-22 ENCOUNTER — RESULTS FOLLOW-UP (OUTPATIENT)
Dept: MEDICAL GROUP | Facility: MEDICAL CENTER | Age: 55
End: 2025-04-22

## 2025-04-22 DIAGNOSIS — M25.511 CHRONIC RIGHT SHOULDER PAIN: ICD-10-CM

## 2025-04-22 DIAGNOSIS — G89.29 CHRONIC RIGHT SHOULDER PAIN: ICD-10-CM

## 2025-04-22 PROCEDURE — 73200 CT UPPER EXTREMITY W/O DYE: CPT | Mod: RT

## 2025-04-22 NOTE — PROCEDURES
DIAGNOSTIC HOME SLEEP TEST (HST) REPORT WatchPAT      PATIENT ID:  NAME:  Wil Schrader  MRN:               [id number]  YOB: 1970  DATE OF STUDY: 04/16/2025      Impression:     This study shows evidence of:      1. Severe obstructive sleep apnea with PAT apnea hypopnea index(pAHI) of 36.7 per hour.  PAT respiratory disturbance index (pRDI) was 40.6 per hour. These findings are based on 7 channels recording of PAT signal with sleep staging, heart rate, pulse oximetry, actigraphy, body position, snoring and respiratory movement.     2. Oxygenation O2 Sat. mean O2 sat was 92%,  kai was 81%,  and maximum O2 at 98 %. O2 sat was at or  below 88% for 12.4 min of evaluation time. Oxygen Desaturation (>=4%) Index was 23.9/hr. AVG HR was 61 BPM.      TECHNICAL DESCRIPTION: Patient underwent home sleep apnea testing with peripheral arterial tone signal (WatchPAT™). This is a Type IV portable monitor and device per Medicare. Monitoring was done with 7 channels recording of PAT signal with sleep staging, heart rate, pulse oximetry, actigraphy, body position, snoring and respiratory movement. Prior to using the device, the patient received verbal and written instructions for its application and was provided with the help desk phone number for additional telephonic instruction with 24-hour availability of qualified personnel to answer questions.    Respiratory events:        General sleep summary: . Total recording time is 7 hours and 58 minutes and total Sleep time is 6 hours and 18 minutes. The patient spent 239 minutes in the supine position and 140 minutes in the nonsupine position.      Recommendations:    1.  Severe obstructive sleep apnea seen with an overall AHI of 36.7 events an hour.  There was slight elevation in central respiratory events indicated by pAHIc of 12.3 events an hour and potential Cheyne-Campa respirations accounting for 13% of these events.  Central respiratory events are  difficult to detect on home study.  May benefit from undergoing PSG titration study, exploring more advanced modes of PAP therapy if needed given possible central respiratory events.  Otherwise may consider starting auto CPAP therapy at home minimum pressure 5 maximum pressure 15 and observing if central apneas are detected by machine.    2. In general patients with sleep apnea are advised to avoid alcohol and sedatives and to not operate a motor vehicle while drowsy. In some cases alternative treatment options may prove effective in resolving sleep apnea in these options include upper airway surgery, the use of a dental orthotic or weight loss and positional therapy. Clinical correlation is required.

## 2025-04-23 DIAGNOSIS — G47.30 SEVERE SLEEP APNEA: ICD-10-CM

## 2025-04-24 ENCOUNTER — ANESTHESIA EVENT (OUTPATIENT)
Facility: MEDICAL CENTER | Age: 55
End: 2025-04-24
Payer: COMMERCIAL

## 2025-04-25 ENCOUNTER — APPOINTMENT (OUTPATIENT)
Dept: RADIOLOGY | Facility: MEDICAL CENTER | Age: 55
End: 2025-04-25
Attending: ORTHOPAEDIC SURGERY
Payer: COMMERCIAL

## 2025-04-25 ENCOUNTER — ANESTHESIA (OUTPATIENT)
Facility: MEDICAL CENTER | Age: 55
End: 2025-04-25
Payer: COMMERCIAL

## 2025-04-25 ENCOUNTER — HOSPITAL ENCOUNTER (OUTPATIENT)
Facility: MEDICAL CENTER | Age: 55
End: 2025-04-25
Attending: ORTHOPAEDIC SURGERY | Admitting: ORTHOPAEDIC SURGERY
Payer: COMMERCIAL

## 2025-04-25 ENCOUNTER — SURGERY (OUTPATIENT)
Age: 55
End: 2025-04-25
Payer: MEDICAID

## 2025-04-25 VITALS
HEIGHT: 66 IN | SYSTOLIC BLOOD PRESSURE: 118 MMHG | DIASTOLIC BLOOD PRESSURE: 67 MMHG | OXYGEN SATURATION: 92 % | HEART RATE: 78 BPM | TEMPERATURE: 97.9 F | RESPIRATION RATE: 19 BRPM | BODY MASS INDEX: 34.19 KG/M2 | WEIGHT: 212.74 LBS

## 2025-04-25 PROCEDURE — C1713 ANCHOR/SCREW BN/BN,TIS/BN: HCPCS | Performed by: ORTHOPAEDIC SURGERY

## 2025-04-25 PROCEDURE — 160047 HCHG PACU  - EA ADDL 30 MINS PHASE II: Performed by: ORTHOPAEDIC SURGERY

## 2025-04-25 PROCEDURE — 502000 HCHG MISC OR IMPLANTS RC 0278: Performed by: ORTHOPAEDIC SURGERY

## 2025-04-25 PROCEDURE — 700102 HCHG RX REV CODE 250 W/ 637 OVERRIDE(OP): Performed by: ANESTHESIOLOGY

## 2025-04-25 PROCEDURE — 700111 HCHG RX REV CODE 636 W/ 250 OVERRIDE (IP)

## 2025-04-25 PROCEDURE — 160015 HCHG STAT PREOP MINUTES: Performed by: ORTHOPAEDIC SURGERY

## 2025-04-25 PROCEDURE — 700105 HCHG RX REV CODE 258: Performed by: ORTHOPAEDIC SURGERY

## 2025-04-25 PROCEDURE — 97166 OT EVAL MOD COMPLEX 45 MIN: CPT

## 2025-04-25 PROCEDURE — 73020 X-RAY EXAM OF SHOULDER: CPT | Mod: RT

## 2025-04-25 PROCEDURE — 160029 HCHG SURGERY MINUTES - 1ST 30 MINS LEVEL 4: Performed by: ORTHOPAEDIC SURGERY

## 2025-04-25 PROCEDURE — A9270 NON-COVERED ITEM OR SERVICE: HCPCS | Performed by: ANESTHESIOLOGY

## 2025-04-25 PROCEDURE — 160025 RECOVERY II MINUTES (STATS): Performed by: ORTHOPAEDIC SURGERY

## 2025-04-25 PROCEDURE — 160048 HCHG OR STATISTICAL LEVEL 1-5: Performed by: ORTHOPAEDIC SURGERY

## 2025-04-25 PROCEDURE — C1776 JOINT DEVICE (IMPLANTABLE): HCPCS | Performed by: ORTHOPAEDIC SURGERY

## 2025-04-25 PROCEDURE — 700111 HCHG RX REV CODE 636 W/ 250 OVERRIDE (IP): Performed by: ANESTHESIOLOGY

## 2025-04-25 PROCEDURE — 160002 HCHG RECOVERY MINUTES (STAT): Performed by: ORTHOPAEDIC SURGERY

## 2025-04-25 PROCEDURE — 700101 HCHG RX REV CODE 250: Performed by: ANESTHESIOLOGY

## 2025-04-25 PROCEDURE — 160041 HCHG SURGERY MINUTES - EA ADDL 1 MIN LEVEL 4: Performed by: ORTHOPAEDIC SURGERY

## 2025-04-25 PROCEDURE — 700111 HCHG RX REV CODE 636 W/ 250 OVERRIDE (IP): Performed by: ORTHOPAEDIC SURGERY

## 2025-04-25 PROCEDURE — 160035 HCHG PACU - 1ST 60 MINS PHASE I: Performed by: ORTHOPAEDIC SURGERY

## 2025-04-25 PROCEDURE — 160009 HCHG ANES TIME/MIN: Performed by: ORTHOPAEDIC SURGERY

## 2025-04-25 PROCEDURE — 160046 HCHG PACU - 1ST 60 MINS PHASE II: Performed by: ORTHOPAEDIC SURGERY

## 2025-04-25 PROCEDURE — 64415 NJX AA&/STRD BRCH PLXS IMG: CPT | Performed by: ORTHOPAEDIC SURGERY

## 2025-04-25 RX ORDER — ONDANSETRON 2 MG/ML
INJECTION INTRAMUSCULAR; INTRAVENOUS PRN
Status: DISCONTINUED | OUTPATIENT
Start: 2025-04-25 | End: 2025-04-25 | Stop reason: SURG

## 2025-04-25 RX ORDER — ALBUTEROL SULFATE 5 MG/ML
2.5 SOLUTION RESPIRATORY (INHALATION)
Status: DISCONTINUED | OUTPATIENT
Start: 2025-04-25 | End: 2025-04-25 | Stop reason: HOSPADM

## 2025-04-25 RX ORDER — HYDROMORPHONE HYDROCHLORIDE 1 MG/ML
0.4 INJECTION, SOLUTION INTRAMUSCULAR; INTRAVENOUS; SUBCUTANEOUS
Status: DISCONTINUED | OUTPATIENT
Start: 2025-04-25 | End: 2025-04-25 | Stop reason: HOSPADM

## 2025-04-25 RX ORDER — ACETAMINOPHEN 500 MG
1000 TABLET ORAL ONCE
Status: COMPLETED | OUTPATIENT
Start: 2025-04-25 | End: 2025-04-25

## 2025-04-25 RX ORDER — VANCOMYCIN HCL 900 MCG/MG
POWDER (GRAM) MISCELLANEOUS
Status: DISCONTINUED | OUTPATIENT
Start: 2025-04-25 | End: 2025-04-25 | Stop reason: HOSPADM

## 2025-04-25 RX ORDER — SODIUM CHLORIDE, SODIUM LACTATE, POTASSIUM CHLORIDE, CALCIUM CHLORIDE 600; 310; 30; 20 MG/100ML; MG/100ML; MG/100ML; MG/100ML
INJECTION, SOLUTION INTRAVENOUS CONTINUOUS
Status: DISCONTINUED | OUTPATIENT
Start: 2025-04-25 | End: 2025-04-25 | Stop reason: HOSPADM

## 2025-04-25 RX ORDER — DEXAMETHASONE SODIUM PHOSPHATE 4 MG/ML
INJECTION, SOLUTION INTRA-ARTICULAR; INTRALESIONAL; INTRAMUSCULAR; INTRAVENOUS; SOFT TISSUE PRN
Status: DISCONTINUED | OUTPATIENT
Start: 2025-04-25 | End: 2025-04-25 | Stop reason: SURG

## 2025-04-25 RX ORDER — MEPERIDINE HYDROCHLORIDE 25 MG/ML
12.5 INJECTION INTRAMUSCULAR; INTRAVENOUS; SUBCUTANEOUS
Status: DISCONTINUED | OUTPATIENT
Start: 2025-04-25 | End: 2025-04-25 | Stop reason: HOSPADM

## 2025-04-25 RX ORDER — OXYCODONE HCL 5 MG/5 ML
5 SOLUTION, ORAL ORAL
Status: DISCONTINUED | OUTPATIENT
Start: 2025-04-25 | End: 2025-04-25 | Stop reason: HOSPADM

## 2025-04-25 RX ORDER — HYDRALAZINE HYDROCHLORIDE 20 MG/ML
5 INJECTION INTRAMUSCULAR; INTRAVENOUS
Status: DISCONTINUED | OUTPATIENT
Start: 2025-04-25 | End: 2025-04-25 | Stop reason: HOSPADM

## 2025-04-25 RX ORDER — HYDROMORPHONE HYDROCHLORIDE 1 MG/ML
0.2 INJECTION, SOLUTION INTRAMUSCULAR; INTRAVENOUS; SUBCUTANEOUS
Status: DISCONTINUED | OUTPATIENT
Start: 2025-04-25 | End: 2025-04-25 | Stop reason: HOSPADM

## 2025-04-25 RX ORDER — CEFAZOLIN SODIUM 1 G/3ML
2 INJECTION, POWDER, FOR SOLUTION INTRAMUSCULAR; INTRAVENOUS ONCE
Status: COMPLETED | OUTPATIENT
Start: 2025-04-25 | End: 2025-04-25

## 2025-04-25 RX ORDER — LIDOCAINE HYDROCHLORIDE 20 MG/ML
INJECTION, SOLUTION EPIDURAL; INFILTRATION; INTRACAUDAL; PERINEURAL PRN
Status: DISCONTINUED | OUTPATIENT
Start: 2025-04-25 | End: 2025-04-25 | Stop reason: SURG

## 2025-04-25 RX ORDER — OXYCODONE HCL 5 MG/5 ML
10 SOLUTION, ORAL ORAL
Status: DISCONTINUED | OUTPATIENT
Start: 2025-04-25 | End: 2025-04-25 | Stop reason: HOSPADM

## 2025-04-25 RX ORDER — DIPHENHYDRAMINE HYDROCHLORIDE 50 MG/ML
12.5 INJECTION, SOLUTION INTRAMUSCULAR; INTRAVENOUS
Status: DISCONTINUED | OUTPATIENT
Start: 2025-04-25 | End: 2025-04-25 | Stop reason: HOSPADM

## 2025-04-25 RX ORDER — ROCURONIUM BROMIDE 10 MG/ML
INJECTION, SOLUTION INTRAVENOUS PRN
Status: DISCONTINUED | OUTPATIENT
Start: 2025-04-25 | End: 2025-04-25 | Stop reason: SURG

## 2025-04-25 RX ORDER — HYDROMORPHONE HYDROCHLORIDE 1 MG/ML
0.5 INJECTION, SOLUTION INTRAMUSCULAR; INTRAVENOUS; SUBCUTANEOUS
Status: DISCONTINUED | OUTPATIENT
Start: 2025-04-25 | End: 2025-04-25 | Stop reason: HOSPADM

## 2025-04-25 RX ORDER — ONDANSETRON 2 MG/ML
4 INJECTION INTRAMUSCULAR; INTRAVENOUS
Status: DISCONTINUED | OUTPATIENT
Start: 2025-04-25 | End: 2025-04-25 | Stop reason: HOSPADM

## 2025-04-25 RX ORDER — HALOPERIDOL 5 MG/ML
1 INJECTION INTRAMUSCULAR
Status: DISCONTINUED | OUTPATIENT
Start: 2025-04-25 | End: 2025-04-25 | Stop reason: HOSPADM

## 2025-04-25 RX ORDER — MIDAZOLAM HYDROCHLORIDE 1 MG/ML
INJECTION INTRAMUSCULAR; INTRAVENOUS PRN
Status: DISCONTINUED | OUTPATIENT
Start: 2025-04-25 | End: 2025-04-25 | Stop reason: SURG

## 2025-04-25 RX ORDER — EPHEDRINE SULFATE 50 MG/ML
INJECTION, SOLUTION INTRAVENOUS PRN
Status: DISCONTINUED | OUTPATIENT
Start: 2025-04-25 | End: 2025-04-25 | Stop reason: SURG

## 2025-04-25 RX ORDER — BUPIVACAINE HYDROCHLORIDE 2.5 MG/ML
INJECTION, SOLUTION EPIDURAL; INFILTRATION; INTRACAUDAL; PERINEURAL PRN
Status: DISCONTINUED | OUTPATIENT
Start: 2025-04-25 | End: 2025-04-25 | Stop reason: SURG

## 2025-04-25 RX ORDER — VANCOMYCIN HYDROCHLORIDE 1 G/20ML
INJECTION, POWDER, LYOPHILIZED, FOR SOLUTION INTRAVENOUS PRN
Status: DISCONTINUED | OUTPATIENT
Start: 2025-04-25 | End: 2025-04-25 | Stop reason: SURG

## 2025-04-25 RX ORDER — CELECOXIB 200 MG/1
200 CAPSULE ORAL ONCE
Status: COMPLETED | OUTPATIENT
Start: 2025-04-25 | End: 2025-04-25

## 2025-04-25 RX ORDER — SODIUM CHLORIDE, SODIUM LACTATE, POTASSIUM CHLORIDE, CALCIUM CHLORIDE 600; 310; 30; 20 MG/100ML; MG/100ML; MG/100ML; MG/100ML
INJECTION, SOLUTION INTRAVENOUS CONTINUOUS
Status: ACTIVE | OUTPATIENT
Start: 2025-04-25 | End: 2025-04-25

## 2025-04-25 RX ORDER — TRANEXAMIC ACID 100 MG/ML
INJECTION, SOLUTION INTRAVENOUS PRN
Status: DISCONTINUED | OUTPATIENT
Start: 2025-04-25 | End: 2025-04-25 | Stop reason: SURG

## 2025-04-25 RX ADMIN — EPHEDRINE SULFATE 10 MG: 50 INJECTION, SOLUTION INTRAVENOUS at 09:53

## 2025-04-25 RX ADMIN — EPHEDRINE SULFATE 10 MG: 50 INJECTION, SOLUTION INTRAVENOUS at 09:25

## 2025-04-25 RX ADMIN — CELECOXIB 200 MG: 200 CAPSULE ORAL at 08:52

## 2025-04-25 RX ADMIN — CEFAZOLIN 2 G: 1 INJECTION, POWDER, FOR SOLUTION INTRAMUSCULAR; INTRAVENOUS at 09:16

## 2025-04-25 RX ADMIN — ROCURONIUM BROMIDE 100 MG: 10 INJECTION INTRAVENOUS at 09:20

## 2025-04-25 RX ADMIN — BUPIVACAINE 10 ML: 13.3 INJECTION, SUSPENSION, LIPOSOMAL INFILTRATION at 09:10

## 2025-04-25 RX ADMIN — SUGAMMADEX 200 MG: 100 INJECTION, SOLUTION INTRAVENOUS at 10:55

## 2025-04-25 RX ADMIN — TRANEXAMIC ACID 1000 MG: 100 INJECTION, SOLUTION INTRAVENOUS at 09:20

## 2025-04-25 RX ADMIN — ACETAMINOPHEN 1000 MG: 500 TABLET ORAL at 08:53

## 2025-04-25 RX ADMIN — DEXAMETHASONE SODIUM PHOSPHATE 8 MG: 4 INJECTION INTRA-ARTICULAR; INTRALESIONAL; INTRAMUSCULAR; INTRAVENOUS; SOFT TISSUE at 09:24

## 2025-04-25 RX ADMIN — PROPOFOL 100 MG: 10 INJECTION, EMULSION INTRAVENOUS at 11:11

## 2025-04-25 RX ADMIN — Medication 1000 MG: at 09:48

## 2025-04-25 RX ADMIN — VANCOMYCIN HYDROCHLORIDE 1.5 G: 1025 INJECTION, POWDER, FOR SOLUTION INTRAVENOUS; ORAL at 09:37

## 2025-04-25 RX ADMIN — EPHEDRINE SULFATE 10 MG: 50 INJECTION, SOLUTION INTRAVENOUS at 09:32

## 2025-04-25 RX ADMIN — MIDAZOLAM HYDROCHLORIDE 2 MG: 1 INJECTION, SOLUTION INTRAMUSCULAR; INTRAVENOUS at 09:08

## 2025-04-25 RX ADMIN — ROCURONIUM BROMIDE 20 MG: 10 INJECTION INTRAVENOUS at 10:30

## 2025-04-25 RX ADMIN — SODIUM CHLORIDE, POTASSIUM CHLORIDE, SODIUM LACTATE AND CALCIUM CHLORIDE: 600; 310; 30; 20 INJECTION, SOLUTION INTRAVENOUS at 09:26

## 2025-04-25 RX ADMIN — LIDOCAINE HYDROCHLORIDE 100 MG: 20 INJECTION, SOLUTION EPIDURAL; INFILTRATION; INTRACAUDAL; PERINEURAL at 09:20

## 2025-04-25 RX ADMIN — PROPOFOL 200 MG: 10 INJECTION, EMULSION INTRAVENOUS at 09:20

## 2025-04-25 RX ADMIN — TRANEXAMIC ACID 1000 MG: 100 INJECTION, SOLUTION INTRAVENOUS at 10:52

## 2025-04-25 RX ADMIN — BUPIVACAINE HYDROCHLORIDE 12 ML: 2.5 INJECTION, SOLUTION EPIDURAL; INFILTRATION; INTRACAUDAL at 09:10

## 2025-04-25 RX ADMIN — EPHEDRINE SULFATE 10 MG: 50 INJECTION, SOLUTION INTRAVENOUS at 10:13

## 2025-04-25 RX ADMIN — ONDANSETRON 4 MG: 2 INJECTION INTRAMUSCULAR; INTRAVENOUS at 10:51

## 2025-04-25 RX ADMIN — SODIUM CHLORIDE, POTASSIUM CHLORIDE, SODIUM LACTATE AND CALCIUM CHLORIDE: 600; 310; 30; 20 INJECTION, SOLUTION INTRAVENOUS at 08:52

## 2025-04-25 RX ADMIN — FENTANYL CITRATE 100 MCG: 50 INJECTION, SOLUTION INTRAMUSCULAR; INTRAVENOUS at 09:20

## 2025-04-25 ASSESSMENT — COGNITIVE AND FUNCTIONAL STATUS - GENERAL
DRESSING REGULAR UPPER BODY CLOTHING: A LITTLE
DAILY ACTIVITIY SCORE: 23
SUGGESTED CMS G CODE MODIFIER DAILY ACTIVITY: CI

## 2025-04-25 ASSESSMENT — ACTIVITIES OF DAILY LIVING (ADL): TOILETING: INDEPENDENT

## 2025-04-25 ASSESSMENT — PAIN SCALES - GENERAL: PAIN_LEVEL: 0

## 2025-04-25 ASSESSMENT — PAIN DESCRIPTION - PAIN TYPE
TYPE: CHRONIC PAIN
TYPE: SURGICAL PAIN

## 2025-04-25 ASSESSMENT — FIBROSIS 4 INDEX: FIB4 SCORE: 1.04

## 2025-04-25 NOTE — DISCHARGE INSTRUCTIONS
See Dr Conn's Discharge Instructions:  Kirstie Conn MD     Office telephone:  [phone]     Shoulder Total Joint Replacement   Post-Operative Instructions       Nerve Block:  The effects of the nerve block may last from 12-60 hours depending on the medication used.   It is recommended to sleep in a semi upright position for the first few days as the nerve block may cause shortness of breath when lying flat.        Dressings: Keep your dressing clean after surgery.  A waterproof adherent dressing will be placed over your incision at the conclusion of your surgery.  Change your dressing one week following surgery with the extra dressing provided.  Please inspect your incision for any surrounding redness or drainage when you change the dressing.  If necessary, you may clean the edges of the wound with soap & water.  Dry the area with a clean cloth.  Then, place a new dressing over your incision.  If the dressing becomes loose or saturated with blood or fluid, it may be replaced at any time. Please keep your incision covered until your first post-operative appointment. At your first post-op appointment, your dressing will be removed.   In most cases, the surgical incision will not need to be covered after two weeks.     Baths/Pools/Hot Tubs.  Please do not submerge your incision in a hot tub, pool, or bath until at least six weeks after surgery.  Make sure that your incision is healed with no remaining scab or drainage before submerging in water.     Compression Hose/HARSHAL hose: Compression hose/HARSHAL hose will be placed on your legs prior to surgery at the hospital.  Please keep these on for at least two days, as they help control leg swelling as well as reduce the risk of a blood clot.   You may remove the compression hose temporarily to shower.     Ice: Use ice or cold therapy to your shoulder as you feel necessary to help with the pain and swelling.  Typically, this is 20 minutes on and 20 minutes off  for the first several days following surgery.  Cold therapy units can be used as directed.        Sling:  Please wear your sling when walking about and when sleeping.  You may carefully remove the sling when awake and seated.  Please support your forearm on a pillow when the sling is removed. You may remove the pillow portion of the sling after two weeks. If you have questions regarding how to wear sling or need a replacement, please contact [facility] @ [phone].     Activity:  You may remove your sling when awake and seated.  Please support your forearm on a pillow.  Once your sling is removed, you may move your elbow, wrist and hand as tolerated. Please do not lift anything heavier than a cup of coffee on your operative side.  Codman/pendulum exercises are encouraged 3-5 times a day. You will need to remove the sling to perform these exercises.     Pain Medication:  Take your pain medication as prescribed, only as needed.  Do not drive or operate machinery while taking narcotic pain medication.  You may resume anti-inflammatory medications any time after surgery. Please take medication with food to avoid stomach upset.     Aspirin:  Please take Aspirin 81 mg twice daily starting the morning after surgery and continue for 2 weeks, to help prevent a blood clot.     Driving:  Please arrange a ride to and from the hospital/surgery center on the day of surgery.   You may drive as soon as you are off pain medication and feel comfortable behind the wheel.     Physical Therapy:  Contact a physical therapy facility approved by your insurance plan to schedule your initial visits.  Typically, physical therapy is scheduled twice weekly to begin at approximately two weeks after surgery for primary total shoulder patients and six weeks after surgery for reverse total shoulder patients.     Follow-up:  Your first post-op appointment should be scheduled 10-14 days after surgery.  The details of your procedure and  specifics of rehabilitation will be discussed.     Problems/concerns: If you have a problem or significant concern (unexpected pain, excessive bleeding or discharge from your incisions, fever or chills) or do not hesitate to call the office @ [phone] or go to your local emergency room.       Kirstie Conn MD     [facility]   [address], [address]   [place], [place] [address]     Office telephone:  [phone]   Office fax: [phone]   If any questions arise, call your provider.  If your provider is not available, please feel free to call the Surgical Center at [phone].    MEDICATIONS: Resume taking daily medication.  Take prescribed pain medication with food.  If no medication is prescribed, you may take non-aspirin pain medication if needed.  PAIN MEDICATION CAN BE VERY CONSTIPATING.  Take a stool softener or laxative such as senokot, pericolace, or milk of magnesia if needed.    What to Expect Post Anesthesia    Rest and take it easy for the first 24 hours.  A responsible adult is recommended to remain with you during that time.  It is normal to feel sleepy.  We encourage you to not do anything that requires balance, judgment or coordination.    FOR 24 HOURS DO NOT:  Drive, operate machinery or run household appliances.  Drink beer or alcoholic beverages.  Make important decisions or sign legal documents.    To avoid nausea, slowly advance diet as tolerated, avoiding spicy or greasy foods for the first day.  Add more substantial food to your diet according to your provider's instructions.  INCREASE FLUIDS AND FIBER TO AVOID CONSTIPATION.    MILD FLU-LIKE SYMPTOMS ARE NORMAL.  YOU MAY EXPERIENCE GENERALIZED MUSCLE ACHES, THROAT IRRITATION, HEADACHE AND/OR SOME NAUSEA.         Peripheral Nerve Block Discharge Instructions from Same Day Surgery and Inpatient :    What to Expect - Upper Extremity  You may experience numbness and weakness in shoulder  on the same side as your  "surgery  This is normal. For some people, this may be an unpleasant sensation. Be very careful with your numb limb  Ask for help when you need it  Shoulder Surgery Side Effects  In addition to numbness and weakness you may experience other symptoms  Other nerves that are close to those nerves injected can also be affected by local anesthesia  You may experience a hoarseness in your voice  Your breathing may feel different  You may also notice drooping of your eyelid, pupil constriction, and decreased sweating, on the side of your surgery  All of these side effects are normal and will resolve when the local anesthetic wears off   Prevent Injury  Protect the limb like a baby  Beware of exposing your limb to extreme heat or cold or trauma  The limb may be injured without you noticing because it is numb  Keep the limb elevated whenever possible  Do not sleep on the limb  Change the position of the limb regularly  Avoid putting pressure on your surgical limb  Pain Control  The initial block on the day of surgery will make your extremity feel \"numb\"  Any consecutive injection including prior to discharge from the hospital will make your extremity feel \"numb\"  You may feel an aching or burning when the local anesthesia starts to wear off  Take pain pills as prescribed by your surgeon  Call your surgeon or anesthesiologist if you do not have adequate pain control    IMPORTANT NOTE IF YOU RECEIVED EXPAREL:    The liposomal bupivacaine (Exparel) teal arm band is used as a visual queue to alert healthcare professionals that you received a long-acting form of bupivacaine during your recent surgery. You should not receive additional local anesthetics (i.e. bupivacaine, lidocaine) for 96 hours after surgery. There may be situations where you are unable to communicate this information, so it is important for your safety to keep the teal arm band on for 96 hours (4 days) after surgery.            "

## 2025-04-25 NOTE — OR NURSING
0700-Pt brought back to pre- op holding. Assumed care.     0758-Patient allergies and NPO status verified, home medication reconciliation completed and belongings secures. Patient verbalizes understanding of pain scale, expected course of stay and plan of care. Surgical site verified with patient. IV access established. Jerald's and SCD'S in place.

## 2025-04-25 NOTE — OR SURGEON
Immediate Post OP Note    PreOp Diagnosis: RIGHT shoulder OA/RC arthropathy      PostOp Diagnosis: SAME      Procedure(s):  RIGHT REVERSE TOTAL SHOULDER ARTHROPLASTY - Wound Class: Clean    Surgeon(s):  Kirstie Conn M.D.    Anesthesiologist/Type of Anesthesia:  Anesthesiologist: Fransisco Bustamante M.D./General    Surgical Staff:  Assistant: JULIO HarmonNMARLINE  Circulator: Tom Valentine R.N.  Limb Sequeira: Shakila Casanova  Scrub Person: Madai Rodriguez    Specimens removed if any:  * No specimens in log *    Estimated Blood Loss: 100 cc    Findings: as above    Complications: none    [facility] [employer]        4/25/2025 11:19 AM Kirstie Conn M.D.

## 2025-04-25 NOTE — OP REPORT
DATE OF SERVICE:  04/25/2025     PREOPERATIVE DIAGNOSIS:  Severe glenohumeral joint arthritis with rotator cuff   arthropathy, status post failed previous rotator cuff repair, right shoulder.     POSTOPERATIVE DIAGNOSIS:  Severe glenohumeral joint arthritis with rotator   cuff arthropathy, status post failed previous rotator cuff repair, right   shoulder.     PROCEDURE:  Right reverse total shoulder arthroplasty.     SURGEON:  Kirstie Conn MD     ASSISTANT:  James Carroll CFA     ANESTHESIOLOGIST:  Fransisco Bustamante MD     TYPE OF ANESTHESIA:  General, with preoperative interscalene nerve block using   Exparel.     INTRAVENOUS FLUID:  1 L of crystalloid.     ESTIMATED BLOOD LOSS:  100 mL.     DRAINS:  None.     SPECIMENS:  None.     COMPLICATIONS:  None.     IMPLANTS:  Shoulder Innovations size 6, I95 stem, 0 metaphyseal tray, 0 mm   polyethylene insert, 15-degree augmented standard baseplate with central screw   and 3 peripheral screws, +6 lateralized 36-mm glenosphere.     REASON FOR PROCEDURE:  The patient is a 55-year-old male with a work-related   injury to his right shoulder, now status post multiple prior procedures to   include most recently a rotator cuff repair that failed to heal.  With his   progressive chondromalacia and weakness, we decided to proceed with reverse   total shoulder arthroplasty.     OPERATION:  The patient was given a right interscalene nerve block using   Exparel by the anesthesiologist before surgery.  Once back in the operating   room, a breathing tube was placed.  He was given 2 grams of IV Ancef as well   as weight-based vancomycin and 1 gram of tranexamic acid.  He was placed in   the beach chair position.  The right upper extremity was prepped with   ChloraPrep and draped in a standard sterile fashion.  It was then placed in   the Spider articulating arm alba.  A deltopectoral incision was made using   the PlasmaBlade.  The cephalic vein was identified and retracted  laterally.  A   Bankart retractor was placed.  There was substantial scarring noted due to   the patient's prior open procedure.  The subscapularis tendon was scarred at   the conjoint tendon and carefully using the PlasmaBlade as well as a finger   sweep, the two individual layers were made to allow for later subscapularis   tendon repair.  Using a peel-off technique, the upper corner was tagged.  The   subscapularis tendon was somewhat thin with some sutures through the   musculotendinous junction region, but was intact.  There was no remaining   superior rotator cuff.  The proximal humerus was subluxated anteriorly.  The   intramedullary cutting guide was then placed.  The humeral neck cut was then   made.  There were several rotator cuff anchors that were encountered.  These   were removed.  Next, the proximal humerus was prepared for a size I95 shoulder   Innovations stem.  A size 6 broach was left in place.  The bowl reamer was   used and a cup retractor was placed.  Retractors were then placed   circumferentially around the glenoid.  There was significant degeneration   noted to the labrum, which was removed.  This allowed for exposure to the   glenoid.  The caliper was used while the patient's preoperative CT ProViant   scan was referenced with the central location marked on the cartilage.  The   pin was then drilled through the far cortex.  The patient's preoperative CT   ProViant scan was referenced and the 15-degree reamer was used.  The post was   then drilled.  The wound was further irrigated and the central pin was   removed.  A 15-degree shoulder Innovations baseplate was then impacted.  An   excellent secure fit was noted.  The central compression screw was drilled and   placed.  This measured 35 mm in length.  An excellent compressive fit was   noted.  Three peripheral screws were placed, two down the scapular pillar, and   one aiming towards the coracoid.  This allowed for fixation of the baseplate    to the glenoid vault to be secured.  A 36+6-mm lateralized glenosphere was   then impacted.  Retractors were then placed around the proximal humerus as it   was subluxated anteriorly.  A 0-degree tray and 0-degree polyethylene trial   was locked into place and this was noted to be the proper tension for this   patient.  The trial proximal humeral implants were then removed.  The lesser   tuberosity was scraped to allow for a healing response with 3 individual   high-strength #2 sutures placed through each drill hole.  On the back table, a   size 0 metaphyseal tray was coupled with a +0 polyethylene insert.  These   were securely locked in place and then impacted on an I95 size 6 stem.  The   humeral construct was then impacted with an excellent fit noted.  The shoulder   was then reduced.  The subscapularis tendon was repaired back to the lesser   tuberosity with multiple modified Michael-Bharath sutures and then oversewn with   an additional XBraid.  Further irrigation with IrriSept and dilute Betadine   was performed.  A 1 gram of vancomycin powder was placed in the deep and   superficial soft tissues.  The deltopectoral interval was then closed with 0   Vicryl.  A running 2-0 Monocryl was used in the subcutaneous layer followed by   a running 3-0 Monocryl in the subcuticular layer.  Mastisol and Steri-Strips   were then placed followed by a sterile Aquacel dressing.  All drapes were then   removed and the arm was carefully taken out of the traction device and placed   into a shoulder abduction sling.  The patient was placed supine on a   stretcher and taken to the recovery room, in stable condition.        ______________________________  MD KALI VILLARREAL/SADAF    DD:  04/25/2025 11:26  DT:  04/25/2025 12:00    Job#:  [id number]

## 2025-04-25 NOTE — OR NURSING
1117 Pt arrived to PACU from OR. VSS. OPA with 6L Simple mask in place.   Pt is resting comfortably. Non-labored respirations. Pt is spontaneous breathing. Surgical dressing CDI and sling in placed from OR.  Okay to waive STOP BANG.    1143 OPA D/c'd  Pt able to follow commands.  No symptoms or complaints regarding pain or nausea at this time.    1144 Called Bo, girlfriend 3x for updates, no answer, phone goes straight to . Dr Conn reported same after surgery completed.     1155 Pt is tolerating sips.     1200 Best IS 3500    1203 Called xray - no answer. Called girlfriend again. Goes straight to .    1212 Shahid from OT will return when family is here. Pt meets criteria for phase II. Trying to contact girlfriend and son, Sadiq and mom. Nobody is answering.     1215 Moved pt into Phase II. Pt able to ambulate with steady gait 50'.     1216 Got in touch with Mom, Marcie. She is trying to call for ride.    1240 Girlfriend, Bo is chairside.     1250 Pt is dressed in shorts and shoes. Awaiting OT.     1300 Discharge instructions completed. No further questions from pt or girlfriend.     1307 Lynnette,OT is chairside.     1327 Pt discharged from Phase II, uneventful stay. All personal belongings and ice sleeve in place.

## 2025-04-25 NOTE — ANESTHESIA PROCEDURE NOTES
Peripheral Block    Date/Time: 4/25/2025 9:08 AM    Performed by: Fransisco Bustamante M.D.  Authorized by: Fransisco Bustamante M.D.    Patient Location:  Pre-op  Start Time:  4/25/2025 9:08 AM  End Time:  4/25/2025 9:12 AM  Reason for Block: at surgeon's request and post-op pain management ONLY    patient identified, IV checked, site marked, risks and benefits discussed, surgical consent, monitors and equipment checked, pre-op evaluation and timeout performed    Patient Position:  Sitting  Prep: ChloraPrep    Monitoring:  Heart rate, continuous pulse ox and cardiac monitor  Block Region:  Upper Extremity  Upper Extremity - Block Type:  BRACHIAL PLEXUS block, Supraclavicular approach    Laterality:  Right  Procedures: ultrasound guided  Image captured, interpreted and electronically stored.  Local Infiltration:  Lidocaine  Strength:  1 %  Dose:  3 ml  Block Type:  Single-shot  Needle Length:  100mm  Needle Gauge:  21 G  Needle Localization:  Ultrasound guidance  Ultrasound picture in chart  Injection Assessment:  Negative aspiration for heme, no paresthesia on injection, incremental injection and local visualized surrounding nerve on ultrasound  Evidence of intravascular injection: No     US Guided Supraclavicular Brachial Plexus Block    US transducer placed cephalad and parallel to clavicle in angle to view the subclavian artery with the brachial plexus lateral and superficial to the artery. Needle inserted lateral to the transducer in-plane and advanced with the needle tip visualized continually into the perineural position. After negative aspiration, LA injected without resistance.

## 2025-04-25 NOTE — ANESTHESIA PREPROCEDURE EVALUATION
Case: [id number] Date/Time: 04/25/25 0845    Procedure: RIGHT REVERSE TOTAL SHOULDER ARTHROPLASTY    Pre-op diagnosis: ROTATOR CUFF ARTHROPLASTY OF RIGHT SHOULDER    Location: [facility] OR 10 / SURGERY DAY SURGERY [facility]    Surgeons: Kirstie Conn M.D.          No devices used for DENNIS  Denies: MI/CHF/smoking/CVA/DM/CKD    Relevant Problems   ANESTHESIA   (positive) Sleep apnea       Physical Exam    Airway   Mallampati: II  TM distance: >3 FB  Neck ROM: full       Cardiovascular - normal exam  Rhythm: regular  Rate: normal  (-) murmur     Dental - normal exam           Pulmonary - normal exam  Breath sounds clear to auscultation     Abdominal    Neurological - normal exam                   Anesthesia Plan    ASA 2       Plan - general and peripheral nerve block     Peripheral nerve block will be post-op pain control  Airway plan will be ETT          Induction: intravenous    Postoperative Plan: Postoperative administration of opioids is intended.    Pertinent diagnostic labs and testing reviewed    Informed Consent:    Anesthetic plan and risks discussed with patient.    Use of blood products discussed with: patient whom consented to blood products.

## 2025-04-25 NOTE — THERAPY
Occupational Therapy   Initial Evaluation     Patient Name: Wil Schrader  Age:  55 y.o., Sex:  male  Medical Record #: [id number]  Today's Date: 4/25/2025     Precautions  Precautions: (P) Non Weight Bearing Right Upper Extremity, Immobilizer Right Upper Extremity    Assessment  Patient is 55 y.o. male s/p R reverse TSA, POD 0. Pt is motivated for home today. Immobilizer in place to RUE; nerve block in effect. Reviewed pendulums. Reviewed home safety during ADL's. Salvador with UB dressing/brace; Spv with other ADL's. Shows good balance; Mod Indep.       Pt and girlfriend educated in detail on ADL's after R Reverse Total Shoulder surgery.   Summary of education completed:  How to adjust sling/immobilizer for best fit.  To keep sling positioned so hand is level or slightly above elbow to reduce pull of gravity on arm and maintain shoulder in neutral positioning.  How to don & doff sling/immobilizer safely and appropriately for dressing and bathing.  How to dress upper body while maintaining post surgical precautions.  How to shower safely.  How to appropriately cover incision for showering if needed prior to removal of staples.    Proper positioning of arm during showering.   Encouraged use of hand held shower (using the non-operative extremity) to keep water away from the incision site.    Safe shower and toilet transfers.  Proper positioning while sleeping either in bed or recliner  Encouraged patient to support arm with pillows under forearm when sitting to reduce strain on the neck from the weight of the arm and immobilizer.   Proper bed mobility and transfer techniques while maintaining NWB on surgical arm.  Proper positioning of arm for gentle wrist/hand ROM and passive elbow extension.    Pain management education - around the clock. Pt and girlfriend verbalized and demonstrated understanding of education provided. DC ready fro OT.  Plan  1x only OT Eval.  DC Equipment Recommendations: (P) None  Discharge  Recommendations: (P) Anticipate that the patient will have no further occupational therapy needs after discharge from the hospital   Objective   04/25/25 1326   Prior Living Situation   Prior Services None   Housing / Facility 1 Story House   Steps Into Home 2   Steps In Home 0   Bathroom Set up Walk In Shower;Built-In Shower Chair   Equipment Owned Hand Held Shower;Tub / Shower Seat   Lives with - Patient's Self Care Capacity Parents   Comments Pt is caregaiver to mother. Girlfriend to help upon dc.   Prior Level of ADL Function   Self Feeding Independent   Grooming / Hygiene Independent   Bathing Independent   Dressing Independent   Toileting Independent   Prior Level of IADL Function   Medication Management Independent   Laundry Independent   Kitchen Mobility Independent   Finances Independent   Home Management Independent   Shopping Independent   Prior Level Of Mobility Independent Without Device in Home   Driving / Transportation Driving Independent   Occupation (Pre-Hospital Vocational) Unable To Determine At This Time   Leisure Interests Hobbies   Precautions   Precautions Non Weight Bearing Right Upper Extremity;Immobilizer Right Upper Extremity   Vitals   O2 Delivery Device None - Room Air   Pain 0 - 10 Group   Therapist Pain Assessment 0   Cognition    Cognition / Consciousness WDL   Level of Consciousness Alert   Passive ROM Upper Body   Comments pt familiar with pendulums an dnot to begin until block is worn off. Handout issued   Active ROM Upper Body   Active ROM Upper Body  X   Dominant Hand Ambidextrous   Comments Brace to RUE   Strength Upper Body   Upper Body Strength  Not Tested   Sensation Upper Body   Upper Extremity Sensation  X   Comments nerve block   Upper Body Muscle Tone   Upper Body Muscle Tone  X   Coordination Upper Body   Coordination X   Balance Assessment   Sitting Balance (Static) Good   Sitting Balance (Dynamic) Good   Standing Balance (Static) Good   Standing Balance (Dynamic) Good    Weight Shift Sitting Good   Weight Shift Standing Good   ADL Assessment   Upper Body Dressing Minimal Assist   Lower Body Dressing Supervision   Toileting Supervision   How much help from another person does the patient currently need...   Putting on and taking off regular lower body clothing? 4   Bathing (including washing, rinsing, and drying)? 4   Toileting, which includes using a toilet, bedpan, or urinal? 4   Putting on and taking off regular upper body clothing? 3   Taking care of personal grooming such as brushing teeth? 4   Eating meals? 4   6 Clicks Daily Activity Score 23   Functional Mobility   Sit to Stand Modified Independent   Bed, Chair, Wheelchair Transfer Modified Independent   Toilet Transfers Modified Independent   Activity Tolerance   Comments functional   Education Group   Education Provided Upper Extremity Range of Motion;Brace Wear and Care   Role of Occupational Therapist Patient Response Patient;Acceptance;Explanation;Verbal Demonstration   Upper Ext ROM Patient Response Patient;Acceptance;Explanation;Verbal Demonstration   Brace Wear & Care Patient Response Patient;Acceptance;Explanation;Verbal Demonstration   Anticipated Discharge Equipment and Recommendations   DC Equipment Recommendations None   Discharge Recommendations Anticipate that the patient will have no further occupational therapy needs after discharge from the hospital   Interdisciplinary Plan of Care Collaboration   IDT Collaboration with  Family / Caregiver;Nursing   Patient Position at End of Therapy Seated;Family / Friend in Room   Collaboration Comments HAFSA Aleman

## 2025-04-25 NOTE — ANESTHESIA PROCEDURE NOTES
Airway    Date/Time: 4/25/2025 9:22 AM    Performed by: Fransisco Bustamante M.D.  Authorized by: Fransisco Bustamante M.D.    Location:  OR  Urgency:  Elective  Difficult Airway: No    Indications for Airway Management:  Anesthesia      Spontaneous Ventilation: absent    Sedation Level:  Deep  Preoxygenated: Yes    Patient Position:  Sniffing  Mask Difficulty Assessment:  1 - vent by mask  Final Airway Type:  Endotracheal airway  Final Endotracheal Airway:  ETT  Cuffed: Yes    Technique Used for Successful ETT Placement:  Direct laryngoscopy    Insertion Site:  Oral  Blade Type:  Gupta  Laryngoscope Blade/Videolaryngoscope Blade Size:  2  ETT Size (mm):  7.0  Measured from:  Lips  ETT to Lips (cm):  22  Placement Verified by: auscultation and capnometry    Cormack-Lehane Classification:  Grade IIa - partial view of glottis  Number of Attempts at Approach:  1

## 2025-04-25 NOTE — ANESTHESIA TIME REPORT
Anesthesia Start and Stop Event Times       Date Time Event    4/25/2025 0729 Ready for Procedure     0916 Anesthesia Start     1121 Anesthesia Stop          Responsible Staff  04/25/25      Name Role Begin End    Fransisco Bustamante M.D. Anesth 0916 1121          Overtime Reason:  no overtime (within assigned shift)    Comments:                                                           Additional Notes: Laser in [facility] for darkness under eyes. Additional Notes: Laser in [facility] for darkness under eyes.

## 2025-04-25 NOTE — ANESTHESIA POSTPROCEDURE EVALUATION
Patient: Wil Schrader    Procedure Summary       Date: 04/25/25 Room / Location: [facility] OR  / SURGERY DAY SURGERY [facility]    Anesthesia Start: 0916 Anesthesia Stop: 1121    Procedure: RIGHT REVERSE TOTAL SHOULDER ARTHROPLASTY (Right: Shoulder) Diagnosis: (ROTATOR CUFF ARTHROPLASTY OF RIGHT SHOULDER)    Surgeons: Kirstie Conn M.D. Responsible Provider: Fransisco Bustamante M.D.    Anesthesia Type: general, peripheral nerve block ASA Status: 2            Final Anesthesia Type: general, peripheral nerve block  Last vitals  BP   Blood Pressure: 118/67    Temp   36.6 °C (97.9 °F)    Pulse   78   Resp   19    SpO2   92 %      Anesthesia Post Evaluation    Patient location during evaluation: PACU  Patient participation: complete - patient participated  Level of consciousness: awake and alert  Pain score: 0    Airway patency: patent  Anesthetic complications: no  Cardiovascular status: hemodynamically stable  Respiratory status: acceptable  Hydration status: euvolemic    PONV: none          There were no known notable events for this encounter.     Nurse Pain Score: 0 (NPRS)

## 2025-04-29 NOTE — Clinical Note
REFERRAL APPROVAL NOTICE         Sent on April 29, 2025                   Wil Schrader  [address] [address]                   Dear Mr. Schrader,    After a careful review of the medical information and benefit coverage, [facility] has processed your referral. See below for additional details.    If applicable, you must be actively enrolled with your insurance for coverage of the authorized service. If you have any questions regarding your coverage, please contact your insurance directly.    REFERRAL INFORMATION   Referral #:  [id number]  Referred-To Department    Referred-By Provider:  Pulmonary and Sleep Medicine    ALEX Little   Pulmonary/sleep [facility]      [address]  [place] [address]  [phone] [address], [address]  [place] [id number] [phone]    Referral Start Date:  04/23/2025  Referral End Date:   04/23/2026           SCHEDULING  If you do not already have an appointment, please call [phone] to make an appointment.   MORE INFORMATION  As a reminder, [facility] ownership has changed, meaning this location is now owned and operated by [facility]. As such, we want to clarify that our patients should expect to receive two separate bills for the services received at [facility] - one representing the [facility] facility fees as the owner of the establishment, and the other to represent the physician's services and subsequent fees. You can speak with your insurance carrier for a pricing estimate by calling the customer service number on the back of your card and ask about charges for a hospital outpatient visit.  If you do not already have a [employer] account, sign up at: [email or web address]  You can access your medical information, make appointments, see lab results, billing information, and  more.  If you have questions regarding this referral, please contact  the [facility] department at:             [phone]. Monday - Friday 7:30AM - 5:00PM.      Sincerely,  [facility]

## 2025-05-13 ENCOUNTER — OFFICE VISIT (OUTPATIENT)
Dept: SLEEP MEDICINE | Facility: MEDICAL CENTER | Age: 55
End: 2025-05-13
Attending: NURSE PRACTITIONER
Payer: MEDICAID

## 2025-05-13 VITALS
DIASTOLIC BLOOD PRESSURE: 74 MMHG | RESPIRATION RATE: 16 BRPM | OXYGEN SATURATION: 95 % | WEIGHT: 209 LBS | HEART RATE: 76 BPM | BODY MASS INDEX: 33.59 KG/M2 | HEIGHT: 66 IN | SYSTOLIC BLOOD PRESSURE: 122 MMHG

## 2025-05-13 DIAGNOSIS — G47.33 OSA (OBSTRUCTIVE SLEEP APNEA): ICD-10-CM

## 2025-05-13 DIAGNOSIS — G47.30 SEVERE SLEEP APNEA: ICD-10-CM

## 2025-05-13 PROCEDURE — 3078F DIAST BP <80 MM HG: CPT | Performed by: NURSE PRACTITIONER

## 2025-05-13 PROCEDURE — 99204 OFFICE O/P NEW MOD 45 MIN: CPT | Performed by: NURSE PRACTITIONER

## 2025-05-13 PROCEDURE — 99212 OFFICE O/P EST SF 10 MIN: CPT | Performed by: NURSE PRACTITIONER

## 2025-05-13 PROCEDURE — 3074F SYST BP LT 130 MM HG: CPT | Performed by: NURSE PRACTITIONER

## 2025-05-13 ASSESSMENT — FIBROSIS 4 INDEX: FIB4 SCORE: 1.04

## 2025-05-13 NOTE — PROGRESS NOTES
Chief Complaint   Patient presents with    New Patient     SLEEP - NEW PATIENT CHART PREP COMPLETED ON 05/06/2025    Ref by  ALEX Little Dx: G47.30 (ICD-10-CM) - Severe sleep apnea     HST 04/16/2025       HPI:  Wil Schrader is a 55 y.o. year old male here today for follow-up on sleep study results.  Presents as new patient consult for sleep medicine.  Kindly referred by RONNIE Hammond.  Patient was referred for a STOP-BANG score of 5 and chronic fatigue with chronic snoring.  Patient has difficulty falling and staying asleep.  States it is due to busy mind.  Patient also recently experienced the murder of his son.  He is currently dealing with the stressors associated with this.  States he does not sleep more than 3 to 4 hours.  Can take up to 1 to 2 hours to fall asleep.  Does note snoring while asleep.  Denies any excessive daytime sleepiness, morning headaches, or heart palpitations.    HST (4/16/2025):   1. Severe obstructive sleep apnea with PAT apnea hypopnea index(pAHI) of 36.7 per hour.  PAT respiratory disturbance index (pRDI) was 40.6 per hour. These findings are based on 7 channels recording of PAT signal with sleep staging, heart rate, pulse oximetry, actigraphy, body position, snoring and respiratory movement.      2. Oxygenation O2 Sat. mean O2 sat was 92%,  kai was 81%,  and maximum O2 at 98 %. O2 sat was at or  below 88% for 12.4 min of evaluation time. Oxygen Desaturation (>=4%) Index was 23.9/hr. AVG HR was 61 BPM.    As per supplemental questionnaire to be scanned or imported into chart:    [place] Sleepiness Score: 3    Sleep Schedule  Bedtime: Weekday 1030 Weekend 0200  Wake time: Weekday 0500 Weekend 0800  Sleep-onset latency: 10m-120m  Awakenings from sleep: 3  Difficulty falling back asleep: no  Bedroom partner: yes  Naps: No     DAYTIME SYMPTOMS:   Excessive daytime sleepiness: No   Daytime fatigue: Yes  Difficulty concentrating: No   Memory problems: No  "  Irritability:Yes  Work/school performance issues: No   Sleepiness with driving: No   Caffeine/stimulant use: No   Alcohol use:No     SLEEP RELATED SYMPTOMS  Snoring: Yes  Witnessed apnea or gasping/choking: Yes  Dry mouth or mouth breathing: Yes  Night Sweating: No   Teeth grinding/biting: Yes  Morning headaches: No   Refreshed Upon Awakening: Yes     SLEEP RELATED BEHAVIORS:  Parasomnias (walking, talking, eating, violence): No   Leg kicking: No   Restless legs - \"urge to move\": No   Nightmares: No  Recurrent: No   Dream enactment: No      NARCOLEPSY:  Cataplexy: No   Sleep paralysis: No   Sleep attacks: No   Hypnagogic/hypnopompic hallucinations: No       ROS: As per HPI and otherwise negative if not stated.    Past Medical History:   Diagnosis Date    Apnea, sleep     Awareness under anesthesia 1996    woke up during shoulder surgery    BMI 36.0-36.9,adult 07/02/2015    Daytime sleepiness     High cholesterol     no longer per patient    Hyperlipidemia     Insomnia     Left shoulder pain 04/08/2024    resolving with TSA    Right foot pain 07/02/2015    patient denies now    Sleep apnea 04/09/2024    No device, has never used    Snoring        Past Surgical History:   Procedure Laterality Date    PB RECONSTR TOTAL SHOULDER IMPLANT Right 4/25/2025    Procedure: RIGHT REVERSE TOTAL SHOULDER ARTHROPLASTY;  Surgeon: Kirstie Conn M.D.;  Location: SURGERY DAY SURGERY [facility];  Service: Orthopedics    PB RECONSTR TOTAL SHOULDER IMPLANT Left 4/9/2024    Procedure: LEFT REVERSE TOTAL SHOULDER ARTHROPLASTY;  Surgeon: Kirstie Conn M.D.;  Location: SURGERY [facility];  Service: Orthopedics    [facility]    Procedure: LEFT SHOULDER ARTHROSCOPY, SUBACROMIAL DECOMPRESSION, ROTATOR CUFF REPAIR, BALLOON ARTHROPLASTY;  Surgeon: Augusto Wilson M.D.;  Location: SURGERY [facility];  Service: Orthopedics    AK LDR ARTHROSCOP,PART ACROMIOPLAS Left 09/07/2022 "    Procedure: DECOMPRESSION, SHOULDER, ARTHROSCOPIC;  Surgeon: Augusto Wilson M.D.;  Location: SURGERY [facility];  Service: Orthopedics    MS UNLISTED PROC, ARTHROSCOPY Left 09/07/2022    Procedure: ARTHROPLASTY,BALLOON;  Surgeon: Augusto Wilson M.D.;  Location: SURGERY [facility];  Service: Orthopedics    PB SHLDR ARTHROSCOP,SURG,W/ROTAT CUFF REPB Right 11/24/2020    Procedure: ARTHROSCOPY, SHOULDER, WITH ROTATOR CUFF REPAIR WITH DEBRIDEMENT OF GLENOHUMERAL JOINT;  Surgeon: Augusto Wilson M.D.;  Location: SURGERY [facility];  Service: Orthopedics    PB ARTHROSCOPY SHOULDER SURGICAL BICEPS TENODES* Right 11/24/2020    Procedure: ARTHROSCOPY, SHOULDER, WITH BICEPS  TENOTOMY;  Surgeon: Augusto Wilson M.D.;  Location: SURGERY [facility];  Service: Orthopedics    MS SHLDR ARTHROSCOP,PART ACROMIOPLAS Right 11/24/2020    Procedure: DECOMPRESSION, SUBACROMIAL SPACE;  Surgeon: Augusto Wilson M.D.;  Location: SURGERY [facility];  Service: Orthopedics    KNEE ARTHROSCOPY Right 2018    CATARACT PHACO WITH IOL Left 01/12/2016    Procedure: CATARACT PHACO WITH IOL ANTERIOR CHAMBER;  Surgeon: Aureliano Epperson M.D.;  Location: SURGERY [facility];  Service:     VITRECTOMY ANTERIOR Left 10/13/2015    Procedure: VITRECTOMY ANTERIOR - ANTERIOR SYNECHIALYSIS, IRIS REPAIR WITH MCCANNEL SUTURES;  Surgeon: Aureliano Epperson M.D.;  Location: SURGERY [facility];  Service:     EYE SURGERY  10/03/2008    Performed by SHANTEL GARAY at SURGERY SAME DAY [facility] ORS    EYEBALL RUPTURE REPAIR  09/19/2008    Performed by SHANTEL GARAY at SURGERY [facility] ORS    ROTATOR CUFF REPAIR Bilateral 1996    bilateral rotator cuff repairs    COLONOSCOPY      OTHER  2000    right shoulder    TONSILLECTOMY  as a child       Family History   Problem Relation Age of Onset    Hypertension Mother     Psychiatric Illness Mother     Colon Cancer Mother     Cancer Mother         Colon    Heart Disease  "Father     Thyroid Sister     Cancer Maternal Grandmother         Breast    Breast Cancer Maternal Grandmother     Cancer Maternal Grandfather         Lung from smoking    Lung Cancer Maternal Grandfather        Allergies as of 05/13/2025    (No Known Allergies)        Vitals:  /74 (BP Location: Left arm, Patient Position: Sitting, BP Cuff Size: Adult)   Pulse 76   Resp 16   Ht 1.676 m (5' 6\")   Wt 94.8 kg (209 lb)   SpO2 95%     Current medications as of today   Current Outpatient Medications   Medication Sig Dispense Refill    Vitamin D-Vitamin K (VITAMIN K2-VITAMIN D3 PO) Take  by mouth every 7 days.      ergocalciferol (DRISDOL) 61086 UNIT capsule Take 1 Capsule by mouth every 7 days. 12 Capsule 0     No current facility-administered medications for this visit.         Physical Exam:   Gen:           Alert and oriented, No apparent distress. Mood and affect appropriate, normal interaction with examiner.  Eyes:          PERRL, EOM intact, sclere white, conjunctive moist.  Ears:          Not examined.   Hearing:     Grossly intact.  Nose:          Normal, no lesions or deformities.  Dentition:    Good dentition.  Oropharynx:   Tongue normal, posterior pharynx without erythema or exudate.  Neck:        Supple, trachea midline, no masses.  Respiratory Effort: No intercostal retractions or use of accessory muscles.   Lung Auscultation:      Clear to auscultation bilaterally; no rales, rhonchi or wheezing.  CV:            Regular rate and rhythm. No murmurs, rubs or gallops.  Abd:           Not examined.   Lymphadenopathy: Not examined.  Gait and Station: Normal.  Digits and Nails: No clubbing, cyanosis, petechiae, or nodes.   Cranial Nerves: II-XII grossly intact.  Skin:        No rashes, lesions or ulcers noted.               Ext:           No cyanosis or edema.      Assessment:  1. Severe sleep apnea            Plan:   I reviewed with the patient the pathophysiology of obstructive sleep apnea, as well as " potential cardiac and neurologic risks associated with untreated sleep apnea including CAD, HTN, pulmonary arterial hypertension, cardiac arrhythmias, heart attack or stroke.  DENNIS patient's have increased risk of motor vehicle accidents, DM type II, chronic kidney disease and nonalcoholic liver disease.  He is cautioned against driving while sleepy for his safety and safety of others on the road. We reviewed treatment modalities for sleep apnea including CPAP/BiPAP therapy, ENT referral, dental appliance.      This study shows evidence of:      1. Severe obstructive sleep apnea with PAT apnea hypopnea index(pAHI) of 36.7 per hour.  PAT respiratory disturbance index (pRDI) was 40.6 per hour. These findings are based on 7 channels recording of PAT signal with sleep staging, heart rate, pulse oximetry, actigraphy, body position, snoring and respiratory movement.      2. Oxygenation O2 Sat. mean O2 sat was 92%,  kai was 81%,  and maximum O2 at 98 %. O2 sat was at or  below 88% for 12.4 min of evaluation time. Oxygen Desaturation (>=4%) Index was 23.9/hr. AVG HR was 61 BPM.        TECHNICAL DESCRIPTION: Patient underwent home sleep apnea testing with peripheral arterial tone signal (WatchPAT™). This is a Type IV portable monitor and device per Medicare. Monitoring was done with 7 channels recording of PAT signal with sleep staging, heart rate, pulse oximetry, actigraphy, body position, snoring and respiratory movement. Prior to using the device, the patient received verbal and written instructions for its application and was provided with the help desk phone number for additional telephonic instruction with 24-hour availability of qualified personnel to answer questions.     Respiratory events:         General sleep summary: . Total recording time is 7 hours and 58 minutes and total Sleep time is 6 hours and 18 minutes. The patient spent 239 minutes in the supine position and 140 minutes in the nonsupine position.         Recommendations:     1.  Severe obstructive sleep apnea seen with an overall AHI of 36.7 events an hour.  There was slight elevation in central respiratory events indicated by pAHIc of 12.3 events an hour and potential Cheyne-Campa respirations accounting for 13% of these events.  Central respiratory events are difficult to detect on home study.  May benefit from undergoing PSG titration study, exploring more advanced modes of PAP therapy if needed given possible central respiratory events.  Otherwise may consider starting auto CPAP therapy at home minimum pressure 5 maximum pressure 15 and observing if central apneas are detected by machine.    At this time, patient is suffering from grief associated with the loss of his child.  Also suffering from stressors that would cause insomnia.  Patient is not amendable to any recommendations to treat DENNIS at this time.  He was advised that if he would like to return for treatment, must follow-up within 1 year of study or sleep study should be repeated.    Please note that this dictation was created using voice recognition software. I have made every reasonable attempt to correct obvious errors, but it is possible there are errors of grammar and possibly content that I did not discover before finalizing the note.

## 2025-06-09 DIAGNOSIS — E55.9 VITAMIN D DEFICIENCY: ICD-10-CM

## 2025-06-09 NOTE — TELEPHONE ENCOUNTER
Received request via: Pharmacy    Was the patient seen in the last year in this department? Yes    Does the patient have an active prescription (recently filled or refills available) for medication(s) requested? No    Pharmacy Name: [facility]    Does the patient have alf Plus and need 100-day supply? (This applies to ALL medications) Patient does not have SCP

## 2025-06-10 RX ORDER — ERGOCALCIFEROL 1.25 MG/1
50000 CAPSULE, LIQUID FILLED ORAL
Qty: 12 CAPSULE | Refills: 0 | Status: SHIPPED | OUTPATIENT
Start: 2025-06-10

## 2025-07-23 ENCOUNTER — HOSPITAL ENCOUNTER (EMERGENCY)
Facility: MEDICAL CENTER | Age: 55
End: 2025-07-23
Attending: EMERGENCY MEDICINE
Payer: MEDICAID

## 2025-07-23 VITALS
DIASTOLIC BLOOD PRESSURE: 83 MMHG | WEIGHT: 218.48 LBS | TEMPERATURE: 97 F | HEART RATE: 91 BPM | HEIGHT: 66 IN | SYSTOLIC BLOOD PRESSURE: 146 MMHG | OXYGEN SATURATION: 95 % | BODY MASS INDEX: 35.11 KG/M2 | RESPIRATION RATE: 16 BRPM

## 2025-07-23 DIAGNOSIS — T15.92XA FOREIGN BODY OF LEFT EYE, INITIAL ENCOUNTER: ICD-10-CM

## 2025-07-23 DIAGNOSIS — S05.02XA ABRASION OF LEFT CORNEA, INITIAL ENCOUNTER: ICD-10-CM

## 2025-07-23 DIAGNOSIS — Z00.00 ROUTINE HEALTH MAINTENANCE: Primary | ICD-10-CM

## 2025-07-23 PROCEDURE — 99283 EMERGENCY DEPT VISIT LOW MDM: CPT

## 2025-07-23 PROCEDURE — 700101 HCHG RX REV CODE 250: Mod: UD | Performed by: EMERGENCY MEDICINE

## 2025-07-23 PROCEDURE — 65220 REMOVE FOREIGN BODY FROM EYE: CPT

## 2025-07-23 RX ORDER — ERYTHROMYCIN 5 MG/G
1 OINTMENT OPHTHALMIC 4 TIMES DAILY
Qty: 3.5 G | Refills: 0 | Status: ACTIVE | OUTPATIENT
Start: 2025-07-23 | End: 2025-07-30

## 2025-07-23 RX ORDER — FLUORESCEIN SODIUM 1 MG/MG
1 STRIP OPHTHALMIC ONCE
Status: COMPLETED | OUTPATIENT
Start: 2025-07-23 | End: 2025-07-23

## 2025-07-23 RX ORDER — ERYTHROMYCIN 5 MG/G
1 OINTMENT OPHTHALMIC ONCE
Status: COMPLETED | OUTPATIENT
Start: 2025-07-23 | End: 2025-07-23

## 2025-07-23 RX ORDER — PROPARACAINE HYDROCHLORIDE 5 MG/ML
1 SOLUTION/ DROPS OPHTHALMIC ONCE
Status: COMPLETED | OUTPATIENT
Start: 2025-07-23 | End: 2025-07-23

## 2025-07-23 RX ADMIN — PROPARACAINE HYDROCHLORIDE 1 DROP: 5 SOLUTION/ DROPS OPHTHALMIC at 16:00

## 2025-07-23 RX ADMIN — ERYTHROMYCIN 1 APPLICATION: 5 OINTMENT OPHTHALMIC at 16:30

## 2025-07-23 RX ADMIN — FLUORESCEIN SODIUM 1 MG: 1 STRIP OPHTHALMIC at 16:00

## 2025-07-23 ASSESSMENT — FIBROSIS 4 INDEX: FIB4 SCORE: 1.04

## 2025-07-23 NOTE — ED TRIAGE NOTES
Chief Complaint   Patient presents with    Foreign Body in Eye     Patient reports was doing yard work when he felt something in left eye. Patient reports itching, and burning to eye. Patient seen at [facility] and told to come to this ED.

## 2025-07-23 NOTE — ED NOTES
Pt ambulated from the lobby to [facility] with a steady gait. Pt attached to SPO2 and BP monitoring. Pt charted up for ERP.

## 2025-07-23 NOTE — ED PROVIDER NOTES
ER Provider Note    Scribed for Dr. Delvin Rivas M.D. by Nneka Nieto. 7/23/2025  3:38 PM    Primary Care Provider: ALEX Little    CHIEF COMPLAINT  Chief Complaint   Patient presents with    Foreign Body in Eye     Patient reports was doing yard work when he felt something in left eye. Patient reports itching, and burning to eye. Patient seen at [facility] and told to come to this ED.      EXTERNAL RECORDS REVIEWED  External ED Note Patient was seen at [facility] for an eye complaint and was ultimately referred to report to the ED.    HPI/ROS    Wil Matt Schrader is a 55 y.o. male who presents to the ED for a foreign body in the left eye onset earlier today. The patient states that he was performing yard work when a foreign body flew into his eye. He reports that he was wearing protective glasses while performing yard work. Patient has associated itching and burning to his left eye, adding that he is unable to open the eye secondary to these symptoms. He states that he reported to [facility] where he was evaluated by a physician there but was recommended to present to the ED for further evaluation. The patient reports that he has been blind in the past and has undergone multiple surgeries to restore vision to his eye.     PAST MEDICAL HISTORY  Past Medical History[1]    SURGICAL HISTORY  Past Surgical History[2]    FAMILY HISTORY  Family History   Problem Relation Age of Onset    Hypertension Mother     Psychiatric Illness Mother     Colon Cancer Mother     Cancer Mother         Colon    Heart Disease Father     Thyroid Sister     Cancer Maternal Grandmother         Breast    Breast Cancer Maternal Grandmother     Cancer Maternal Grandfather         Lung from smoking    Lung Cancer Maternal Grandfather        SOCIAL HISTORY   reports that he has never smoked. He quit smokeless tobacco use about 35 years ago.  His smokeless tobacco use included chew. He reports current alcohol use. He  "reports that he does not currently use drugs after having used the following drugs: Inhaled and Marijuana.    CURRENT MEDICATIONS  Discharge Medication List as of 7/23/2025  4:44 PM        CONTINUE these medications which have NOT CHANGED    Details   vitamin D2 (ERGOCALCIFEROL) 1.25 mg (46397 Units) Cap capsule TAKE 1 CAPSULE BY MOUTH EVERY 7 DAYS, Disp-12 Capsule, R-0, Normal      Vitamin D-Vitamin K (VITAMIN K2-VITAMIN D3 PO) Take  by mouth every 7 days., Historical Med             ALLERGIES  Patient has no known allergies.    PHYSICAL EXAM  BP (!) 146/83   Pulse 91   Temp 36.1 °C (97 °F) (Temporal)   Resp 16   Ht 1.676 m (5' 6\")   Wt 99.1 kg (218 lb 7.6 oz)   SpO2 95%   BMI 35.26 kg/m²   Constitutional: Alert in no apparent distress.  HENT: No signs of trauma, Bilateral external ears normal, Nose normal.   Eyes: 1 mm piece of rock in the left eye with some discoloration of the cornea. Corneal abrasion at the 11 o'clock position that is 1 mm and another abrasion at the 7 o'clock position where the foreign body is. Pupils are equal and reactive, Conjunctiva normal, Non-icteric.   Neck: Normal range of motion, No tenderness, Supple,   Cardiovascular: Regular rate and rhythm, no murmurs.   Thorax & Lungs: Normal breath sounds, No respiratory distress, No wheezing, No chest tenderness.   Abdomen: Bowel sounds normal, Soft, No tenderness,   Skin: Warm, Dry, No erythema, No rash.   Back: No bony tenderness, No CVA tenderness.   Extremities: No edema, No tenderness, No cyanosis  Neurologic: Alert, Grossly non-focal.    Psychiatric: Affect normal     DIAGNOSTIC STUDIES & PROCEDURES    Foreign Body Removal Procedure Note    Indication: Foreign body in the left eye    Procedure: The area of the foreign body was draped in a sterile fashion. Opthaine 0.5% ophthalmic solution was applied to the patient's left eye. The foreign body was then removed using a Q-tip and had the appearance of rock approximately 1 mm in size. "  After the procedure the area was left open. The patient's tetanus status was updated with a tetanus booster.    The patient tolerated the procedure well.    Complications: None    COURSE & MEDICAL DECISION MAKING    ED Observation Status? No; Patient does not meet criteria for ED Observation.     INITIAL ASSESSMENT AND PLAN  Care Narrative:       3:38 PM - Patient seen and evaluated at bedside. Discussed the plan of care with the patient including performing a fluorescent eye stain to evaluate for any corneal abrasions. Opthaine 0.5% ophthalmic solution was provided to the patient. Patient has a 1 mm piece of rock in the left eye with some discoloration of the cornea. Corneal abrasion at the 11 o'clock position that is 1 mm and another abrasion at the 7 o'clock position where the foreign body is. Performed a foreign body removal at this time and was able to remove some of the pieces of rock. Will have the patient follow up with ophthalmology for further evaluation of his eye. Patient understands and agrees with the plan of care.    4:22 PM - I reevaluated the patient at bedside. I discussed plan for discharge and follow up as outlined below. I informed the patient that I will be prescribing them erythromycin ointment upon discharge and recommend that the patient use the ointment as prescribed. The patient is stable for discharge at this time and will return for any new or worsening symptoms. Patient verbalizes understanding and support with my plan for discharge.      ADDITIONAL PROBLEM LIST AND DISPOSITION  Patient with foreign object in eye.  It was removed by me.  Patient's tetanus is up-to-date.  Erythromycin ointment given.  Spoke to ophthalmology and they will see the patient tomorrow to reevaluate.  Will discharge home with strict return precautions and follow-up.               DISPOSITION AND DISCUSSIONS  I have discussed management of the patient with the following physicians and ADRIANA's: None    Escalation  of care considered, and ultimately not performed: diagnostic imaging.    Decision tools and prescription drugs considered including, but not limited to: Erythromycin ointment.    The patient will return for new or worsening symptoms and is stable at the time of discharge.    DISPOSITION:  Patient will be discharged home in stable condition.    FOLLOW UP:  Paula Evangelista M.D.  [address] [place] [address] [address]  [place] [address]  [phone]    In 1 day        OUTPATIENT MEDICATIONS:  Discharge Medication List as of 7/23/2025  4:44 PM        START taking these medications    Details   erythromycin 5 MG/GM Ointment Apply 1 Application to left eye 4 times a day for 7 days., Disp-3.5 g, R-0, Normal           FINAL IMPRESSION   1. Routine health maintenance    2. Foreign body of left eye, initial encounter    3. Abrasion of left cornea, initial encounter    4.      Nneka ROSE (Scribe), am scribing for, and in the presence of, Delvin Rivas M.D..    Electronically signed by: Nneka Nieto (Scribe), 7/23/2025    Delvin ROSE M.D. personally performed the services described in this documentation, as scribed by Nneka Nieto in my presence, and it is both accurate and complete.    The note accurately reflects work and decisions made by me.  Delvin Rivas M.D.  7/23/2025  5:04 PM         [1]   Past Medical History:  Diagnosis Date    Apnea, sleep     Awareness under anesthesia 1996    woke up during shoulder surgery    BMI 36.0-36.9,adult 07/02/2015    Daytime sleepiness     High cholesterol     no longer per patient    Hyperlipidemia     Insomnia     Left shoulder pain 04/08/2024    resolving with TSA    Right foot pain 07/02/2015    patient denies now    Sleep apnea 04/09/2024    No device, has never used    Snoring    [2]   Past Surgical History:  Procedure Laterality Date    PB RECONSTR TOTAL SHOULDER IMPLANT Right 4/25/2025    Procedure: RIGHT REVERSE TOTAL SHOULDER ARTHROPLASTY;  Surgeon: Kirstie  MANUEL Conn;  Location: SURGERY [facility];  Service: Orthopedics    PB RECONSTR TOTAL SHOULDER IMPLANT Left 4/9/2024    Procedure: LEFT REVERSE TOTAL SHOULDER ARTHROPLASTY;  Surgeon: Kirstie Conn M.D.;  Location: [facility];  Service: Orthopedics    PB SHLDR ARTHROSCOP,SURG,W/ROTAT CUFF REPB Left 09/07/2022    Procedure: LEFT SHOULDER ARTHROSCOPY, SUBACROMIAL DECOMPRESSION, ROTATOR CUFF REPAIR, BALLOON ARTHROPLASTY;  Surgeon: Augusto Wilson M.D.;  Location: [facility];  Service: Orthopedics    CO SHLDR ARTHROSCOP,PART ACROMIOPLAS Left 09/07/2022    Procedure: DECOMPRESSION, SHOULDER, ARTHROSCOPIC;  Surgeon: Augusto Wilosn M.D.;  Location: [facility];  Service: Orthopedics    CO UNLISTED PROC, ARTHROSCOPY Left 09/07/2022    Procedure: ARTHROPLASTY,BALLOON;  Surgeon: Augusto Wilosn M.D.;  Location: [facility];  Service: Orthopedics    PB SHLDR ARTHROSCOP,SURG,W/ROTAT CUFF REPB Right 11/24/2020    Procedure: ARTHROSCOPY, SHOULDER, WITH ROTATOR CUFF REPAIR WITH DEBRIDEMENT OF GLENOHUMERAL JOINT;  Surgeon: Augusto Wilson M.D.;  Location: [facility];  Service: Orthopedics    PB ARTHROSCOPY SHOULDER SURGICAL BICEPS TENODES* Right 11/24/2020    Procedure: ARTHROSCOPY, SHOULDER, WITH BICEPS  TENOTOMY;  Surgeon: Augusto Wilson M.D.;  Location: [facility];  Service: Orthopedics    CO SHLDR ARTHROSCOP,PART ACROMIOPLAS Right 11/24/2020    Procedure: DECOMPRESSION, SUBACROMIAL SPACE;  Surgeon: Augusto Wilson M.D.;  Location: [facility];  Service: Orthopedics    KNEE ARTHROSCOPY Right 2018    CATARACT PHACO WITH IOL Left 01/12/2016    Procedure: CATARACT PHACO WITH IOL ANTERIOR CHAMBER;  Surgeon: Aureliano Epperson M.D.;  Location: [facility];  Service:     VITRECTOMY ANTERIOR Left 10/13/2015    Procedure: VITRECTOMY ANTERIOR - ANTERIOR SYNECHIALYSIS, IRIS REPAIR WITH MCCANNEL  SUTURES;  Surgeon: Aureliano Epperson M.D.;  Location: SURGERY [facility];  Service:     EYE SURGERY  10/03/2008    Performed by SHANTEL GARAY at SURGERY SAME DAY [facility] ORS    EYEBALL RUPTURE REPAIR  09/19/2008    Performed by SHANTEL GARAY at SURGERY [facility] ORS    ROTATOR CUFF REPAIR Bilateral 1996    bilateral rotator cuff repairs    COLONOSCOPY      OTHER  2000    right shoulder    TONSILLECTOMY  as a child

## 2025-07-29 NOTE — Clinical Note
REFERRAL APPROVAL NOTICE         Sent on July 29, 2025                   Wil Schrader  [address] [facility] [address]                   Dear Mr. Schrader,    After a careful review of the medical information and benefit coverage, [facility] has processed your referral. See below for additional details.    If applicable, you must be actively enrolled with your insurance for coverage of the authorized service. If you have any questions regarding your coverage, please contact your insurance directly.    REFERRAL INFORMATION   Referral #:  [id number]  Referred-To Provider    Referred-By Provider:  Ophthalmology    Delvin Rivas M.D.   [facility] EYE [address] Emergency Room  [address]  [facility] [address]  [phone] [address]  [facility] [address]  [phone]    Referral Start Date:  07/23/2025  Referral End Date:   07/23/2026             SCHEDULING  If you do not already have an appointment, please call [phone] to make an appointment.     MORE INFORMATION  If you do not already have a [employer] account, sign up at: [email or web address]  You can access your medical information, make appointments, see lab results, billing information, and more.  If you have questions regarding this referral, please contact  the [facility] Referrals department at:             [phone]. Monday - Friday 8:00AM - 5:00PM.     Sincerely,    [facility]

## (undated) DEVICE — HUMID-VENT HEAT AND MOISTURE EXCHANGE- (50/BX)

## (undated) DEVICE — SUTURE 2-0 MONOCRYL PLUS UNDYED CT-1 1 X 36 (36EA/BX)"

## (undated) DEVICE — AQUACEL 4X4

## (undated) DEVICE — ELECTRODE DUAL RETURN W/ CORD - (50/PK)

## (undated) DEVICE — GVL 3 STAT DISPOSABLE - (10/BX)

## (undated) DEVICE — HANDPIECE 10FT INTPLS SCT PLS IRRIGATION HAND CONTROL SET (6/PK)

## (undated) DEVICE — SHAVER 5.5 RESECTOR FORMULA (5EA/BX )

## (undated) DEVICE — STOCKINETTE, TUBULAR 6

## (undated) DEVICE — GUIDE PIN

## (undated) DEVICE — TUBING DAY USE W/CARTRIDGE (10EA/BX)

## (undated) DEVICE — CANISTER SUCTION RIGID RED 1500CC (40EA/CA)

## (undated) DEVICE — DRAPE LARGE 3 QUARTER - (20/CA)

## (undated) DEVICE — DRESSING 3X8 ADAPTIC GAUZE - NON-ADHERING (36/PK 6PK/BX)

## (undated) DEVICE — COVER LIGHT HANDLE FLEXIBLE - SOFT (2EA/PK 80PK/CA)

## (undated) DEVICE — PACK TOTAL HIP - (1/CA)

## (undated) DEVICE — ELECTRODE 850 FOAM ADHESIVE - HYDROGEL RADIOTRNSPRNT (50/PK)

## (undated) DEVICE — ADHESIVE MASTISOL - (48/BX)

## (undated) DEVICE — SUTURE 0 VICRYL PLUS CT-1 - 8 X 18 INCH (12/BX)

## (undated) DEVICE — SOLUTION PREP PVP IODINE 3/4 OZ POUCH PACKET CONTAINER STERILE LATEX FREE

## (undated) DEVICE — SUTURE 4-0 ETHILON FS-2 18 (36PK/BX)"

## (undated) DEVICE — SPIDER SHOULDER HOLDER (12EA/BX)

## (undated) DEVICE — CLOSURE SKIN STRIP 1/2 X 4 IN - (STERI STRIP) (50/BX 4BX/CA)

## (undated) DEVICE — GLOVE BIOGEL INDICATOR SZ 8 SURGICAL PF LTX - (50/BX 4BX/CA)

## (undated) DEVICE — DRAPETIBURON SHOULDER W/POUCH - (5EA/CA)

## (undated) DEVICE — SUTURE 3-0 MONOCRYL PLUS PS-2 - (12/BX)

## (undated) DEVICE — SENSOR SPO2 NEO LNCS ADHESIVE (20/BX) SEE USER NOTES

## (undated) DEVICE — NEEDLE SAFETY 18 GA X 1 1/2 IN (100EA/BX)

## (undated) DEVICE — HEAD HOLDER JUNIOR/ADULT

## (undated) DEVICE — TUBE CONNECTING SUCTION - CLEAR PLASTIC STERILE 72 IN (50EA/CA)

## (undated) DEVICE — TUBING PATIENT W/CONNECTOR REDEUCE (1EA)

## (undated) DEVICE — SENSOR OXIMETER ADULT SPO2 RD SET (20EA/BX)

## (undated) DEVICE — SUTURE GENERAL

## (undated) DEVICE — GLOVE BIOGEL INDICATOR SZ 8.5 SURGICAL PF LTX - (50/BX 4BX/CA)

## (undated) DEVICE — GLOVE SURGICAL PROTEXIS PI 8.0 LF - (50PR/BX)

## (undated) DEVICE — TAPE XBRAID TT 2.0MM (12EA/BX)

## (undated) DEVICE — GOWN WARMING STANDARD FLEX - (30/CA)

## (undated) DEVICE — SODIUM CHL. IRRIGATION 0.9% 3000ML (4EA/CA 65CA/PF)

## (undated) DEVICE — COVER MAYO STAND X-LG - (22EA/CA)

## (undated) DEVICE — GLOVE, LITE (PAIR)

## (undated) DEVICE — TUBING PUMP WITH CONNECTOR REDEUCE (1EA)

## (undated) DEVICE — TUBING CASSETTE CROSSFLOW INTEGRATED (10EA/CA)

## (undated) DEVICE — DEVICE MONOPOLAR RF PEAK PLASMABLADE 3.0S

## (undated) DEVICE — WATER IRRIGATION STERILE 1000ML (12EA/CA)

## (undated) DEVICE — TIP INTPLS HFLO ML ORFC BTRY - (12/CS)  FOR SURGILAV

## (undated) DEVICE — LACTATED RINGERS INJ 1000 ML - (14EA/CA 60CA/PF)

## (undated) DEVICE — BAG SPONGE COUNT 10.25 X 32 - BLUE (250/CA)

## (undated) DEVICE — GLOVE BIOGEL INDICATOR SZ 6.5 SURGICAL PF LTX - (50PR/BX 4BX/CA)

## (undated) DEVICE — SODIUM CHL IRRIGATION 0.9% 1000ML (12EA/CA)

## (undated) DEVICE — SUCTION INSTRUMENT YANKAUER BULBOUS TIP W/O VENT (50EA/CA)

## (undated) DEVICE — CHLORAPREP 26 ML APPLICATOR - ORANGE TINT(25/CA)

## (undated) DEVICE — DRILL BIT

## (undated) DEVICE — NEEDLE MAYO CATGUT TPR POINT - (2EA/PK20PK/BX)

## (undated) DEVICE — GLOVE SZ 6.5 BIOGEL PI MICRO - PF LF (50PR/BX)

## (undated) DEVICE — BLADE SAGITTAL SAW DUAL CUT 75.0 X 25.0MM (1/EA)

## (undated) DEVICE — SYRINGE ASEPTO - (50EA/CA

## (undated) DEVICE — DRAPE SURG STERI-DRAPE 7X11OD - (10EA/BX, 40EA/CA)

## (undated) DEVICE — TOWEL STOP TIMEOUT SAFETY FLAG (40EA/CA)

## (undated) DEVICE — GUIDEWIRE

## (undated) DEVICE — Device

## (undated) DEVICE — PACK SHOULDER ARTHROSCOPY SM - (2EA/CA)

## (undated) DEVICE — NEEDLE W/FACET TIP DULL VERSION W/STIMULATION CABLE SONOPLEX 21G X 4 (10/EA)"

## (undated) DEVICE — GLOVE 7.0 LF PF PROTEXIS (50PR/BX)

## (undated) DEVICE — DRAPE SURG STERI-DRAPE 7X11OD - (40EA/CA)

## (undated) DEVICE — PROTECTOR ULNA NERVE - (36PR/CA)

## (undated) DEVICE — TOWELS CLOTH SURGICAL - (4/PK 20PK/CA)

## (undated) DEVICE — DRAPE SHOULDER FLUID CONTROL - 77 X 85 (10/CA)

## (undated) DEVICE — CANNULA THREADED 5X75 (5EA/BX)

## (undated) DEVICE — NEPTUNE 4 PORT MANIFOLD - (20/PK)

## (undated) DEVICE — SUTURE NONABSORBABLE XBRAID #2 26MM (12EA/BX)

## (undated) DEVICE — SHAVER4.0 AGGRESSIVE + FORMLA (5EA/BX)

## (undated) DEVICE — SUTURE 2.0MM TAPE VIOLET MUST ORDER 12 EACH AT A TIME

## (undated) DEVICE — GLOVE BIOGEL SZ 7 SURGICAL PF LTX - (50PR/BX 4BX/CA)

## (undated) DEVICE — DRAPE U ORTHOPEDIC - (10/BX)

## (undated) DEVICE — ABLATOR WAND SERFAS 90-S CRUISE

## (undated) DEVICE — CANNULA FULLY THREADED 8 X 75 (5EA/BX)

## (undated) DEVICE — HEMOSTAT SURG ABSORBABLE - 4 X 8 IN SURGICEL (24EA/CA)

## (undated) DEVICE — MASK ANESTHESIA ADULT  - (100/CA)

## (undated) DEVICE — TIP INTPLS HFLO ML ORFC BTRY - (12/CS) FOR SURGILAV

## (undated) DEVICE — MASK AIRWAY SIZE 4 UNIQUE SILICON (10EA/BX)

## (undated) DEVICE — KIT ANESTHESIA W/CIRCUIT & 3/LT BAG W/FILTER (20EA/CA)